# Patient Record
Sex: FEMALE | Race: WHITE
[De-identification: names, ages, dates, MRNs, and addresses within clinical notes are randomized per-mention and may not be internally consistent; named-entity substitution may affect disease eponyms.]

---

## 2019-01-31 ENCOUNTER — HOSPITAL ENCOUNTER (INPATIENT)
Dept: HOSPITAL 80 - BREH | Age: 79
LOS: 22 days | Discharge: NURSING, REHAB OR LONG-TERM CARE | DRG: 92 | End: 2019-02-22
Attending: INTERNAL MEDICINE | Admitting: INTERNAL MEDICINE
Payer: COMMERCIAL

## 2019-01-31 DIAGNOSIS — E11.51: ICD-10-CM

## 2019-01-31 DIAGNOSIS — K21.9: ICD-10-CM

## 2019-01-31 DIAGNOSIS — G95.11: Primary | ICD-10-CM

## 2019-01-31 DIAGNOSIS — G89.29: ICD-10-CM

## 2019-01-31 DIAGNOSIS — G82.22: ICD-10-CM

## 2019-01-31 DIAGNOSIS — Z96.652: ICD-10-CM

## 2019-01-31 DIAGNOSIS — Z87.440: ICD-10-CM

## 2019-01-31 DIAGNOSIS — E11.40: ICD-10-CM

## 2019-01-31 DIAGNOSIS — E03.9: ICD-10-CM

## 2019-01-31 DIAGNOSIS — E66.01: ICD-10-CM

## 2019-01-31 DIAGNOSIS — N31.9: ICD-10-CM

## 2019-01-31 DIAGNOSIS — E78.5: ICD-10-CM

## 2019-01-31 DIAGNOSIS — I10: ICD-10-CM

## 2019-01-31 DIAGNOSIS — K59.2: ICD-10-CM

## 2019-01-31 RX ADMIN — GABAPENTIN SCH MG: 100 CAPSULE ORAL at 20:28

## 2019-01-31 RX ADMIN — DOCUSATE SODIUM AND SENNOSIDES SCH TAB: 50; 8.6 TABLET ORAL at 20:27

## 2019-01-31 RX ADMIN — INSULIN HUMAN SCH UNITS: 100 INJECTION, SUSPENSION SUBCUTANEOUS at 21:20

## 2019-01-31 RX ADMIN — HYDROCODONE BITARTRATE AND ACETAMINOPHEN PRN TAB: 5; 325 TABLET ORAL at 20:27

## 2019-01-31 RX ADMIN — TRIAMCINOLONE ACETONIDE SCH APP: 1 OINTMENT TOPICAL at 21:20

## 2019-01-31 RX ADMIN — NORTRIPTYLINE HYDROCHLORIDE SCH MG: 25 CAPSULE ORAL at 20:28

## 2019-01-31 RX ADMIN — INSULIN HUMAN SCH: 100 INJECTION, SOLUTION PARENTERAL at 21:29

## 2019-01-31 RX ADMIN — ATORVASTATIN CALCIUM SCH MG: 20 TABLET, FILM COATED ORAL at 20:28

## 2019-02-01 LAB — PLATELET # BLD: 316 10^3/UL (ref 150–400)

## 2019-02-01 RX ADMIN — GABAPENTIN SCH MG: 100 CAPSULE ORAL at 16:45

## 2019-02-01 RX ADMIN — Medication SCH MG: at 09:03

## 2019-02-01 RX ADMIN — DOCUSATE SODIUM AND SENNOSIDES SCH TAB: 50; 8.6 TABLET ORAL at 09:02

## 2019-02-01 RX ADMIN — ATORVASTATIN CALCIUM SCH MG: 20 TABLET, FILM COATED ORAL at 21:12

## 2019-02-01 RX ADMIN — INSULIN HUMAN SCH UNITS: 100 INJECTION, SOLUTION PARENTERAL at 22:28

## 2019-02-01 RX ADMIN — GABAPENTIN SCH MG: 100 CAPSULE ORAL at 21:11

## 2019-02-01 RX ADMIN — INSULIN HUMAN SCH UNITS: 100 INJECTION, SOLUTION PARENTERAL at 17:34

## 2019-02-01 RX ADMIN — ASPIRIN 81 MG SCH MG: 81 TABLET ORAL at 09:03

## 2019-02-01 RX ADMIN — INSULIN HUMAN SCH UNITS: 100 INJECTION, SOLUTION PARENTERAL at 09:16

## 2019-02-01 RX ADMIN — FUROSEMIDE SCH MG: 20 TABLET ORAL at 09:02

## 2019-02-01 RX ADMIN — ENOXAPARIN SODIUM SCH MG: 100 INJECTION SUBCUTANEOUS at 09:06

## 2019-02-01 RX ADMIN — HYDROCODONE BITARTRATE AND ACETAMINOPHEN PRN TAB: 5; 325 TABLET ORAL at 20:34

## 2019-02-01 RX ADMIN — LEVOTHYROXINE SODIUM SCH MCG: 75 TABLET ORAL at 06:37

## 2019-02-01 RX ADMIN — GABAPENTIN SCH MG: 100 CAPSULE ORAL at 09:02

## 2019-02-01 RX ADMIN — INSULIN HUMAN SCH UNITS: 100 INJECTION, SOLUTION PARENTERAL at 11:58

## 2019-02-01 RX ADMIN — IRON SUPPLEMENT SCH MG: 325 TABLET ORAL at 09:03

## 2019-02-01 RX ADMIN — TRIAMCINOLONE ACETONIDE SCH APP: 1 OINTMENT TOPICAL at 21:53

## 2019-02-01 RX ADMIN — INSULIN HUMAN SCH UNITS: 100 INJECTION, SUSPENSION SUBCUTANEOUS at 22:28

## 2019-02-01 RX ADMIN — TRIAMCINOLONE ACETONIDE SCH: 1 OINTMENT TOPICAL at 12:01

## 2019-02-01 RX ADMIN — BISACODYL SCH MG: 10 SUPPOSITORY RECTAL at 16:46

## 2019-02-01 RX ADMIN — DOCUSATE SODIUM AND SENNOSIDES SCH TAB: 50; 8.6 TABLET ORAL at 09:09

## 2019-02-01 RX ADMIN — DOCUSATE SODIUM AND SENNOSIDES SCH TAB: 50; 8.6 TABLET ORAL at 21:12

## 2019-02-01 RX ADMIN — NORTRIPTYLINE HYDROCHLORIDE SCH MG: 25 CAPSULE ORAL at 21:12

## 2019-02-01 RX ADMIN — Medication SCH MG: at 17:35

## 2019-02-01 NOTE — PDOREHIP
Admission [facility]





- Admission - 3 Day Assessment Period


Admission Date/Day 1: 01/31/19


Day 2: 02/01/19


Day 3: 02/02/19





- Active Diagnoses


Comorbidities and Co-existing Conditions at Admission: 41839. PVD or PAD, 

44904. DM (e.g. diabetic retinopathy, nephropathy, and neuropathy)





- Skin Conditions


Unhealed Pressure Ulcer (1 or more/Stage 1 or >)-Admission: 0. No


# Stage 1 Pressure Ulcers-Admission: 0


# Stage 2 Pressure Ulcers-Admission: 0


# Stage 3 Pressure Ulcers-Admission: 0


# Stage 4 Pressure Ulcers-Admission: 0


# Unstageable Pressure Ulcers (Non-remove Dress)-Admission: 0


# Unstageable Pressure Ulcers (Slough/Eschar)-Admission: 0


# Unstageable Pressure Ulcers (Deep Tissue Injury)-Admission: 0

## 2019-02-01 NOTE — SOAPPROG
SOAP Progress Note


Assessment/Plan: 


Assessment:





Spinal cord injury, T1 level, incomplete RYAN class C.  PT and OT to optimize 

her mobility and activities of daily living. 





Complications of spinal cord injury with neurogenic bowel, neurogenic bladder 

and very impaired lower extremity motor function.  She will be treated with the 

neurogenic bowel and bladder protocols.  


* Liu catheter to be removed 2/1/2019 or as soon as the unit obtains enough 

catheters for Q 4 hr intermittent straight catheterization.  She will be taught 

once again in intermittent bladder catheterization to be done by herself.  She 

has adequate upper extremity motor function and sensation to be able to 

accomplish this task.  


* She is at risk for autonomic dysreflexia and will be monitored for signs of 

distress or elevated blood pressure and will be treated with the autonomic 

dysreflexia protocol on the rehabilitation unit. 





Chronic conditions of hypertension, dyslipidemia, hypothyroidism, and diabetes 

mellitus type 2.  She will be continued on medications for these conditions.


* Initiate metformin 500 mg twice daily with meals beginning evening of 2/1/ 2019.  Will likely be able to discontinue sliding scale insulin subsequently.  





History of peripheral neuropathy.  She is treated with nortriptyline 10 mg at 

h.s.  As she also reports insomnia, I will increase this to 25 mg at bedtime.  

Continue gabapentin at 100 mg three times daily.  Consider titration if 

symptoms are bothersome.





Peripheral vascular disease.  Continue aspirin. 





Skin lesions on the left toes.  These appear to be more likely consistent with 

cholesterol emboli than with pressure ulcers.  There will be a wound nurse 

consult to optimize her management. 





Fluid overload seen in the hospital.  


* Continue furosemide.  Renal function normal on BMP 2/1/2019.





Hypomagnesemia.  Continue magnesium oxide.  Magnesium very slightly low on 2/1/ 2019.





Anemia in the hospital.  


* Also had significant iron deficiency, so initiated iron sulfate 325 mg q.day 

starting 2/1/2019.


* Stable on CBC 2/1/2019, with hemoglobin 8.3 and hematocrit 25.8.  Recheck 

hemoglobin and hematocrit in 1 week





Obesity and diabetes mellitus will merit a consult with the dietitian for 

optimal nutrition.  





Prophylaxis.  She has prolonged immobility and elevated risk for DVT.  Will 

initiate enoxaparin at 40 mg subcutaneous daily.  This can be discontinued if 

her mobility improves significantly or once she is 2-3 months out from the 

onset of her spinal cord injury.








02/01/19 14:17





Subjective: 





Slept well.  Not in pain.  Notes improved movement to her feet.  No cough or 

dyspnea, no fevers or chills.


Objective: 





 Vital Signs











Temp Pulse Resp BP Pulse Ox


 


 36.9 C   102 H  18   138/70 H  92 


 


 02/01/19 05:28  02/01/19 05:28  02/01/19 05:28  02/01/19 09:05  02/01/19 05:28








 Laboratory Results





 02/01/19 06:00 





 02/01/19 06:00 





 











 01/31/19 02/01/19 02/02/19





 05:59 05:59 05:59


 


Intake Total  400 


 


Output Total  700 


 


Balance  -300 














Physical Exam





- Physical Exam


General Appearance: WD/WN, alert, no apparent distress, obese


Respiratory: No respiratory distress, No accessory muscle use


Skin: normal color, warm/dry


Neuro/Psych: alert, normal mood/affect, oriented x 3, motor weakness (

Demonstrates some movement of the feet, right greater than left, and has 

quadriceps extension of the right leg.)





ICD10 Worksheet


Patient Problems: 


 Problems











Problem Status Onset


 


Spinal cord stroke Acute

## 2019-02-02 RX ADMIN — ENOXAPARIN SODIUM SCH MG: 100 INJECTION SUBCUTANEOUS at 08:44

## 2019-02-02 RX ADMIN — FUROSEMIDE SCH MG: 20 TABLET ORAL at 08:47

## 2019-02-02 RX ADMIN — ASPIRIN 81 MG SCH MG: 81 TABLET ORAL at 08:46

## 2019-02-02 RX ADMIN — NORTRIPTYLINE HYDROCHLORIDE SCH MG: 25 CAPSULE ORAL at 21:12

## 2019-02-02 RX ADMIN — GABAPENTIN SCH MG: 100 CAPSULE ORAL at 21:13

## 2019-02-02 RX ADMIN — Medication SCH MG: at 17:39

## 2019-02-02 RX ADMIN — INSULIN HUMAN SCH UNITS: 100 INJECTION, SUSPENSION SUBCUTANEOUS at 21:13

## 2019-02-02 RX ADMIN — TRIAMCINOLONE ACETONIDE SCH: 1 OINTMENT TOPICAL at 17:04

## 2019-02-02 RX ADMIN — GABAPENTIN SCH MG: 100 CAPSULE ORAL at 08:45

## 2019-02-02 RX ADMIN — GABAPENTIN SCH MG: 100 CAPSULE ORAL at 17:18

## 2019-02-02 RX ADMIN — BISACODYL SCH MG: 10 SUPPOSITORY RECTAL at 19:40

## 2019-02-02 RX ADMIN — DOCUSATE SODIUM AND SENNOSIDES SCH TAB: 50; 8.6 TABLET ORAL at 08:45

## 2019-02-02 RX ADMIN — HYDROCODONE BITARTRATE AND ACETAMINOPHEN PRN TAB: 5; 325 TABLET ORAL at 08:47

## 2019-02-02 RX ADMIN — IRON SUPPLEMENT SCH MG: 325 TABLET ORAL at 08:47

## 2019-02-02 RX ADMIN — LEVOTHYROXINE SODIUM SCH MCG: 75 TABLET ORAL at 06:37

## 2019-02-02 RX ADMIN — TRIAMCINOLONE ACETONIDE SCH APP: 1 OINTMENT TOPICAL at 19:40

## 2019-02-02 RX ADMIN — DOCUSATE SODIUM AND SENNOSIDES SCH TAB: 50; 8.6 TABLET ORAL at 21:12

## 2019-02-02 RX ADMIN — INSULIN HUMAN SCH UNITS: 100 INJECTION, SOLUTION PARENTERAL at 08:44

## 2019-02-02 RX ADMIN — ATORVASTATIN CALCIUM SCH MG: 20 TABLET, FILM COATED ORAL at 21:12

## 2019-02-02 RX ADMIN — Medication SCH MG: at 08:46

## 2019-02-02 RX ADMIN — ACETAMINOPHEN PRN MG: 325 TABLET ORAL at 17:18

## 2019-02-02 NOTE — HOSPPROG
Hospitalist Progress Note


Assessment/Plan: 





Spinal cord injury, T1 level, incomplete RYAN class C.  PT and OT to optimize 

her mobility and activities of daily living. 





Complications of spinal cord injury with neurogenic bowel, neurogenic bladder 

and very impaired lower extremity motor function.  She will be treated with the 

neurogenic bowel and bladder protocols.  


* Liu catheter to be removed 2/1/2019 or as soon as the unit obtains enough 

catheters for Q 4 hr intermittent straight catheterization.  She will be taught 

once again in intermittent bladder catheterization to be done by herself.  She 

has adequate upper extremity motor function and sensation to be able to 

accomplish this task.  


* She is at risk for autonomic dysreflexia and will be monitored for signs of 

distress or elevated blood pressure and will be treated with the autonomic 

dysreflexia protocol on the rehabilitation unit. 


* STILL WAITING FOR SELF CATH SUPPLIES SO DESIREE IS STILL IN





Chronic conditions of hypertension, dyslipidemia, hypothyroidism, and diabetes 

mellitus type 2.  She will be continued on medications for these conditions.


* Initiate metformin 500 mg twice daily with meals beginning evening of 2/1/ 2019.  Will likely be able to discontinue sliding scale insulin subsequently.


* WILL DC SLIDING SCALE - BLOOD SUGARS ARE MOSTLY BELOW 200. WAIT SEVERAL DAYS 

BEFORE INCREASING METFORMIN  





History of peripheral neuropathy.  She is treated with nortriptyline 10 mg at 

h.s.  As she also reports insomnia, I will increase this to 25 mg at bedtime.  

Continue gabapentin at 100 mg three times daily.  Consider titration if 

symptoms are bothersome.





Peripheral vascular disease.  Continue aspirin. 





Skin lesions on the left toes.  These appear to be more likely consistent with 

cholesterol emboli than with pressure ulcers.  There will be a wound nurse 

consult to optimize her management. 





Fluid overload seen in the hospital.  


* Continue furosemide.  Renal function normal on BMP 2/1/2019.





Hypomagnesemia.  Continue magnesium oxide.  Magnesium very slightly low on 2/1/ 2019.





Anemia in the hospital.  


* Also had significant iron deficiency, so initiated iron sulfate 325 mg q.day 

starting 2/1/2019.


* Stable on CBC 2/1/2019, with hemoglobin 8.3 and hematocrit 25.8.  Recheck 

hemoglobin and hematocrit in 1 week





Obesity and diabetes mellitus will merit a consult with the dietitian for 

optimal nutrition.  





Prophylaxis.  She has prolonged immobility and elevated risk for DVT.  Will 

initiate enoxaparin at 40 mg subcutaneous daily.  This can be discontinued if 

her mobility improves significantly or once she is 2-3 months out from the 

onset of her spinal cord injury.








Subjective: no new compliants. very happy with rehab so far.


Objective: 


 Vital Signs











Temp Pulse Resp BP Pulse Ox


 


 37.0 C   106 H  18   142/71 H  93 


 


 02/01/19 20:00  02/01/19 20:00  02/01/19 20:00  02/02/19 08:46  02/01/19 20:00








 Laboratory Results





 02/01/19 06:00 





 02/01/19 06:00 





 











 02/01/19 02/02/19 02/03/19





 05:59 05:59 05:59


 


Intake Total 400 900 


 


Output Total 700 1800 


 


Balance -300 -900 














- Physical Exam


Constitutional: no apparent distress, appears nourished, not in pain


Eyes: anicteric sclera, EOMI


Ears, Nose, Mouth, Throat: moist mucous membranes


Cardiovascular: regular rate and rhythym, no murmur, rub, or gallop


Respiratory: no respiratory distress, no rales or rhonchi


Skin: warm, other (superficial ischemic skin wounds left toes)


Neurologic: AAOx3, other (wiggles toes)





ICD10 Worksheet


Patient Problems: 


 Problems











Problem Status Onset


 


Spinal cord stroke Acute

## 2019-02-03 RX ADMIN — DOCUSATE SODIUM AND SENNOSIDES SCH TAB: 50; 8.6 TABLET ORAL at 08:46

## 2019-02-03 RX ADMIN — IRON SUPPLEMENT SCH MG: 325 TABLET ORAL at 08:47

## 2019-02-03 RX ADMIN — INSULIN HUMAN SCH UNITS: 100 INJECTION, SUSPENSION SUBCUTANEOUS at 21:13

## 2019-02-03 RX ADMIN — ASPIRIN 81 MG SCH MG: 81 TABLET ORAL at 08:47

## 2019-02-03 RX ADMIN — GABAPENTIN SCH MG: 100 CAPSULE ORAL at 21:14

## 2019-02-03 RX ADMIN — GABAPENTIN SCH MG: 100 CAPSULE ORAL at 16:38

## 2019-02-03 RX ADMIN — LEVOTHYROXINE SODIUM SCH MCG: 75 TABLET ORAL at 06:25

## 2019-02-03 RX ADMIN — TRIAMCINOLONE ACETONIDE SCH APP: 1 OINTMENT TOPICAL at 21:14

## 2019-02-03 RX ADMIN — Medication SCH MG: at 18:20

## 2019-02-03 RX ADMIN — ENOXAPARIN SODIUM SCH MG: 100 INJECTION SUBCUTANEOUS at 08:47

## 2019-02-03 RX ADMIN — FUROSEMIDE SCH MG: 20 TABLET ORAL at 08:46

## 2019-02-03 RX ADMIN — GABAPENTIN SCH: 100 CAPSULE ORAL at 14:10

## 2019-02-03 RX ADMIN — NORTRIPTYLINE HYDROCHLORIDE SCH MG: 25 CAPSULE ORAL at 21:14

## 2019-02-03 RX ADMIN — ATORVASTATIN CALCIUM SCH MG: 20 TABLET, FILM COATED ORAL at 21:14

## 2019-02-03 RX ADMIN — TRIAMCINOLONE ACETONIDE SCH APP: 1 OINTMENT TOPICAL at 08:48

## 2019-02-03 RX ADMIN — Medication SCH MG: at 08:45

## 2019-02-03 RX ADMIN — BISACODYL SCH: 10 SUPPOSITORY RECTAL at 16:34

## 2019-02-03 RX ADMIN — DOCUSATE SODIUM AND SENNOSIDES SCH TAB: 50; 8.6 TABLET ORAL at 21:14

## 2019-02-03 RX ADMIN — GABAPENTIN SCH MG: 100 CAPSULE ORAL at 08:46

## 2019-02-03 NOTE — HOSPPROG
Hospitalist Progress Note


Assessment/Plan: 





Spinal cord injury, T1 level, incomplete RYAN class C.  PT and OT to optimize 

her mobility and activities of daily living. 





Complications of spinal cord injury with neurogenic bowel, neurogenic bladder 

and very impaired lower extremity motor function.  She will be treated with the 

neurogenic bowel and bladder protocols.  


* Liu catheter to be removed 2/1/2019 or as soon as the unit obtains enough 

catheters for Q 4 hr intermittent straight catheterization.  She will be taught 

once again in intermittent bladder catheterization to be done by herself.  She 

has adequate upper extremity motor function and sensation to be able to 

accomplish this task.  


* She is at risk for autonomic dysreflexia and will be monitored for signs of 

distress or elevated blood pressure and will be treated with the autonomic 

dysreflexia protocol on the rehabilitation unit. 


* BEING TAUGHT HOW TO SELF CATH





Chronic conditions of hypertension, dyslipidemia, hypothyroidism, and diabetes 

mellitus type 2.  She will be continued on medications for these conditions.


* Initiate metformin 500 mg twice daily with meals beginning evening of 2/1/ 2019.  Will likely be able to discontinue sliding scale insulin subsequently.


* WILL DC SLIDING SCALE - BLOOD SUGARS ARE MOSTLY BELOW 200. WAIT SEVERAL DAYS 

BEFORE INCREASING METFORMIN  


* DONT KNOW WHY SHE IS ON NPH AT NIGHT - THAT IS HER HOME REGIMEN





History of peripheral neuropathy.  She is treated with nortriptyline 10 mg at 

h.s.  As she also reports insomnia, I will increase this to 25 mg at bedtime.  

Continue gabapentin at 100 mg three times daily.  Consider titration if 

symptoms are bothersome.


* NEW NEUROPATHIC PAIN PRESUMABLY FROM SPINAL CORD INJURY - WILL INCREASE 

GABAPENTIN  MG TID. SHE HAD SOME NAUSEA IN THE PAST TO IT SO WILL 

INCREASE SLOWLY





Peripheral vascular disease.  Continue aspirin. 





Skin lesions on the left toes.  These appear to be more likely consistent with 

cholesterol emboli than with pressure ulcers.  There will be a wound nurse 

consult to optimize her management. 





Fluid overload seen in the hospital.  


* Continue furosemide.  Renal function normal on BMP 2/1/2019.


* INCREASE FUROSEMIDE TO 40 MG DAILY





CLOUDY URINE


* CHECK UA





Hypomagnesemia.  Continue magnesium oxide.  Magnesium very slightly low on 2/1/ 2019.





Anemia in the hospital.  


* Also had significant iron deficiency, so initiated iron sulfate 325 mg q.day 

starting 2/1/2019.


* Stable on CBC 2/1/2019, with hemoglobin 8.3 and hematocrit 25.8.  Recheck 

hemoglobin and hematocrit in 1 week





Obesity and diabetes mellitus will merit a consult with the dietitian for 

optimal nutrition.  





Prophylaxis.  She has prolonged immobility and elevated risk for DVT.  Will 

initiate enoxaparin at 40 mg subcutaneous daily.  This can be discontinued if 

her mobility improves significantly or once she is 2-3 months out from the 

onset of her spinal cord injury.








Subjective: norco doesn't help with pain. gabapentin does. wondering about 

increasing the dose. also wth cloudy smelly urine per RN


Objective: 


 Vital Signs











Temp Pulse Resp BP Pulse Ox


 


 36.8 C   100   16   133/75 H  97 


 


 02/02/19 20:00  02/02/19 20:00  02/02/19 20:00  02/03/19 08:47  02/02/19 20:00








 Laboratory Results





 02/01/19 06:00 





 02/01/19 06:00 





 











 02/02/19 02/03/19 02/04/19





 05:59 05:59 05:59


 


Intake Total 900  


 


Output Total 1800 1325 


 


Balance -900 -1325 














- Physical Exam


Constitutional: no apparent distress, appears nourished, not in pain


Eyes: anicteric sclera, EOMI


Ears, Nose, Mouth, Throat: moist mucous membranes


Cardiovascular: regular rate and rhythym, edema (2+)


Respiratory: no respiratory distress, no rales or rhonchi


Gastrointestinal: normoactive bowel sounds, soft, non-tender abdomen, no 

palpable masses


Skin: warm


Neurologic: AAOx3





ICD10 Worksheet


Patient Problems: 


 Problems











Problem Status Onset


 


Spinal cord stroke Acute

## 2019-02-04 RX ADMIN — DOCUSATE SODIUM AND SENNOSIDES SCH TAB: 50; 8.6 TABLET ORAL at 08:09

## 2019-02-04 RX ADMIN — Medication SCH MG: at 08:09

## 2019-02-04 RX ADMIN — IRON SUPPLEMENT SCH MG: 325 TABLET ORAL at 08:09

## 2019-02-04 RX ADMIN — GABAPENTIN SCH MG: 100 CAPSULE ORAL at 16:03

## 2019-02-04 RX ADMIN — NORTRIPTYLINE HYDROCHLORIDE SCH MG: 25 CAPSULE ORAL at 21:35

## 2019-02-04 RX ADMIN — GABAPENTIN SCH MG: 100 CAPSULE ORAL at 21:35

## 2019-02-04 RX ADMIN — ENOXAPARIN SODIUM SCH MG: 100 INJECTION SUBCUTANEOUS at 08:10

## 2019-02-04 RX ADMIN — ACETAMINOPHEN PRN MG: 325 TABLET ORAL at 21:35

## 2019-02-04 RX ADMIN — DOCUSATE SODIUM AND SENNOSIDES SCH: 50; 8.6 TABLET ORAL at 19:15

## 2019-02-04 RX ADMIN — INSULIN HUMAN SCH UNITS: 100 INJECTION, SUSPENSION SUBCUTANEOUS at 21:35

## 2019-02-04 RX ADMIN — Medication SCH MG: at 17:19

## 2019-02-04 RX ADMIN — TRIAMCINOLONE ACETONIDE SCH APP: 1 OINTMENT TOPICAL at 08:26

## 2019-02-04 RX ADMIN — TRIAMCINOLONE ACETONIDE SCH APP: 1 OINTMENT TOPICAL at 21:35

## 2019-02-04 RX ADMIN — FUROSEMIDE SCH MG: 20 TABLET ORAL at 08:09

## 2019-02-04 RX ADMIN — BISACODYL SCH: 10 SUPPOSITORY RECTAL at 15:29

## 2019-02-04 RX ADMIN — ACETAMINOPHEN PRN MG: 325 TABLET ORAL at 13:19

## 2019-02-04 RX ADMIN — LEVOTHYROXINE SODIUM SCH MCG: 75 TABLET ORAL at 07:18

## 2019-02-04 RX ADMIN — ASPIRIN 81 MG SCH MG: 81 TABLET ORAL at 08:09

## 2019-02-04 RX ADMIN — ATORVASTATIN CALCIUM SCH MG: 20 TABLET, FILM COATED ORAL at 21:35

## 2019-02-04 RX ADMIN — GABAPENTIN SCH MG: 100 CAPSULE ORAL at 08:09

## 2019-02-04 NOTE — SOAPPROG
SOAP Progress Note


Assessment/Plan: 


Assessment:


























Plan:





Subjective: 





Patient reports that she continues to have pain around her ankles.  She had 

some increased neuropathic pain over the weekend and her dosage of gabapentin 

was increased.  She does not report nausea with the gabapentin.  She does not 

report pain in her left toes.  She denies of the dysuria or suprapubic pain.


Objective: 





 Vital Signs











Temp Pulse Resp BP Pulse Ox


 


 36.8 C   100   19   143/79 H  93 


 


 02/04/19 07:26  02/04/19 07:26  02/04/19 07:26  02/04/19 08:08  02/04/19 07:26








 Laboratory Results





 02/01/19 06:00 





 02/01/19 06:00 





 











 02/03/19 02/04/19 02/05/19





 05:59 05:59 05:59


 


Intake Total  1350 354


 


Output Total 8873 1950 


 


Balance -1325 -600 354














ICD10 Worksheet


Patient Problems: 


 Problems











Problem Status Onset


 


Spinal cord stroke Acute

## 2019-02-04 NOTE — WOCRNPDOC
WOCRN Advanced Assessment Note





- Skin Integrity Problem, Advanced Assess


  ** Left Toe Unknown


Dressing Type: Open to Air


Wound Bed Constitution: Stable Eschar


Skin Integrity Problem Comment: First four toes on left foot have evidence of 

ischemic injury that is at least a month old. No sign of infection. The eschar 

on several of the wounds is peeling off and reveals a clean fully epithelized 

wound bed. The wound on the first digit is the deepest and will take the 

longest to heal, but as all of the wounds are healing appropriately continuing 

the current plan of care works. Please continue to paint wounds with betadine 

BID until all the eschar has peeled off. This may take several months for the 

great toe. Wound care will sign off.





  ** Perianal Denuded


Dressing Type: Open to Air


Skin Integrity Problem Comment: Mild partial thickness epithelial loss from 6 

to 9 oclock around anus to 0.5 cm. No concerns. May treat with calazime cream 

then switch to dimethicone cream when the area has epithelized. Wound care will 

sign off.

## 2019-02-04 NOTE — SOAPPROG
SOAP Progress Note


Assessment/Plan: 


Assessment:








Spinal cord injury, T1 level, incomplete RYAN class C.  PT and OT to optimize 

her mobility and activities of daily living.  GABAPENTIN FOR LOWER EXTREMITY 

NEUROPATHIC PAIN





Complications of spinal cord injury with neurogenic bowel, neurogenic bladder 

and very impaired lower extremity motor function.  She will be treated with the 

neurogenic bowel and bladder protocols.  


* Liu catheter to be removed 2/1/2019 or as soon as the unit obtains enough 

catheters for Q 4 hr intermittent straight catheterization.  Team rounds on 02/ 04 indicates the patient is unable to perform self catheterization due to a 

combination of body habitus and inability to abduct hips.  She has adequate 

upper extremity motor function and sensation to be able to accomplish this 

task.  


* She is at risk for autonomic dysreflexia and will be monitored for signs of 

distress or elevated blood pressure and will be treated with the autonomic 

dysreflexia protocol on the rehabilitation unit. 





Chronic conditions of hypertension, dyslipidemia, hypothyroidism, and diabetes 

mellitus type 2.  She will be continued on medications for these conditions.


* Initiate metformin 500 mg twice daily with meals beginning evening of 2/1/ 2019.  SLIDING SCALE INSULIN DISCHARGED OVER THE WEEKEND.


History of peripheral neuropathy.  She is treated with nortriptyline 10 mg at 

h.s.  As she also reports insomnia, I will increase this to 25 mg at bedtime.  

Continue gabapentin at 100 mg three times daily.  Consider titration if 

symptoms are bothersome.





HYPERTENSION-BLOOD PRESSURES TO ON 02/04 143/79.





Peripheral vascular disease.  Continue aspirin.  GOOD CAPILLARY REFILL OF BOTH 

LOWER EXTREMITIES





Skin lesions on the left toes.  These appear to be more likely consistent with 

cholesterol emboli than with pressure ulcers.  NURSING HAS NOTIFIED WOUND CARE 

NURSE WHO WILL SEE PATIENT TODAY.





Fluid overload seen in the hospital.  


* Continue furosemide.  Renal function normal on BMP 2/1/2019.





Hypomagnesemia.  Continue magnesium oxide.  Magnesium very slightly low on 2/1/ 2019.





Anemia in the hospital.  


* Also had significant iron deficiency, so initiated iron sulfate 325 mg q.day 

starting 2/1/2019.


* Stable on CBC 2/1/2019, with hemoglobin 8.3 and hematocrit 25.8.  Recheck 

hemoglobin and hematocrit in 1 week





Obesity and diabetes mellitus will merit a consult with the dietitian for 

optimal nutrition.  





Prophylaxis.  She has prolonged immobility and elevated risk for DVT.  Will 

initiate enoxaparin at 40 mg subcutaneous daily.  This can be discontinued if 

her mobility improves significantly or once she is 2-3 months out from the 

onset of her spinal cord injury.  OCCUPATIONAL THERAPY HAS ADDED THIGH-HIGH 

COMPRESSION STOCKINGS WHICH IS APPROPRIATE.




















Plan:





02/04/19 11:15





02/04/19 11:17





Subjective: 





The patient reports she has some pain and tightness around both ankles.  She 

had some increased neuropathic pain in the lower extremities over the weekend 

and her dosage of gabapentin was increased.  She denies nausea or other side 

effects with the gabapentin.  She denies abdominal pain.  She denies dysuria.


Objective: 





 Vital Signs











Temp Pulse Resp BP Pulse Ox


 


 36.8 C   100   19   143/79 H  93 


 


 02/04/19 07:26  02/04/19 07:26  02/04/19 07:26  02/04/19 08:08  02/04/19 07:26








 Laboratory Results





 02/01/19 06:00 





 02/01/19 06:00 





 











 02/03/19 02/04/19 02/05/19





 05:59 05:59 05:59


 


Intake Total  1350 354


 


Output Total 1325 1950 


 


Balance -1325 -600 354














Physical Exam





- Physical Exam


General Appearance: WD/WN, alert, no apparent distress


Respiratory: lungs clear, normal breath sounds


Peripheral Pulses: 1+: dorsalis-pedis (R), dorsalis-pedis (L)


Abdomen: normal bowel sounds, non-tender


Skin: other (Gangrenous changes distal toes with skin sloughing in all 5 

digits.  Both lower extremities warm, no erythema or induration.)


Extremities: No swelling, No Cheli's sign


Neuro/Psych: motor weakness (Motor exam consistent with paraplegia with 2/5 

strength of ankle dorsiflexors.)





ICD10 Worksheet


Patient Problems: 


 Problems











Problem Status Onset


 


Spinal cord stroke Acute

## 2019-02-05 RX ADMIN — LEVOTHYROXINE SODIUM SCH MCG: 75 TABLET ORAL at 05:04

## 2019-02-05 RX ADMIN — NORTRIPTYLINE HYDROCHLORIDE SCH MG: 25 CAPSULE ORAL at 22:05

## 2019-02-05 RX ADMIN — ENOXAPARIN SODIUM SCH MG: 100 INJECTION SUBCUTANEOUS at 08:20

## 2019-02-05 RX ADMIN — FUROSEMIDE SCH MG: 20 TABLET ORAL at 08:19

## 2019-02-05 RX ADMIN — INSULIN HUMAN SCH UNITS: 100 INJECTION, SUSPENSION SUBCUTANEOUS at 20:57

## 2019-02-05 RX ADMIN — BISACODYL SCH: 10 SUPPOSITORY RECTAL at 15:35

## 2019-02-05 RX ADMIN — Medication SCH MG: at 08:19

## 2019-02-05 RX ADMIN — TRIAMCINOLONE ACETONIDE SCH APP: 1 OINTMENT TOPICAL at 22:06

## 2019-02-05 RX ADMIN — TRIAMCINOLONE ACETONIDE SCH APP: 1 OINTMENT TOPICAL at 08:28

## 2019-02-05 RX ADMIN — GABAPENTIN SCH MG: 100 CAPSULE ORAL at 15:39

## 2019-02-05 RX ADMIN — ACETAMINOPHEN PRN MG: 325 TABLET ORAL at 05:04

## 2019-02-05 RX ADMIN — Medication SCH MG: at 17:24

## 2019-02-05 RX ADMIN — IRON SUPPLEMENT SCH MG: 325 TABLET ORAL at 08:20

## 2019-02-05 RX ADMIN — DOCUSATE SODIUM AND SENNOSIDES SCH: 50; 8.6 TABLET ORAL at 22:06

## 2019-02-05 RX ADMIN — DOCUSATE SODIUM AND SENNOSIDES SCH: 50; 8.6 TABLET ORAL at 08:28

## 2019-02-05 RX ADMIN — ATORVASTATIN CALCIUM SCH MG: 20 TABLET, FILM COATED ORAL at 22:05

## 2019-02-05 RX ADMIN — GABAPENTIN SCH MG: 100 CAPSULE ORAL at 20:57

## 2019-02-05 RX ADMIN — GABAPENTIN SCH MG: 100 CAPSULE ORAL at 08:19

## 2019-02-05 RX ADMIN — ASPIRIN 81 MG SCH MG: 81 TABLET ORAL at 08:19

## 2019-02-06 RX ADMIN — NORTRIPTYLINE HYDROCHLORIDE SCH MG: 25 CAPSULE ORAL at 21:11

## 2019-02-06 RX ADMIN — ATORVASTATIN CALCIUM SCH MG: 20 TABLET, FILM COATED ORAL at 21:11

## 2019-02-06 RX ADMIN — FUROSEMIDE SCH MG: 20 TABLET ORAL at 09:48

## 2019-02-06 RX ADMIN — IRON SUPPLEMENT SCH MG: 325 TABLET ORAL at 09:49

## 2019-02-06 RX ADMIN — Medication SCH MG: at 17:30

## 2019-02-06 RX ADMIN — GABAPENTIN SCH MG: 100 CAPSULE ORAL at 21:11

## 2019-02-06 RX ADMIN — TRIAMCINOLONE ACETONIDE SCH APP: 1 OINTMENT TOPICAL at 21:00

## 2019-02-06 RX ADMIN — TRIAMCINOLONE ACETONIDE SCH APP: 1 OINTMENT TOPICAL at 09:50

## 2019-02-06 RX ADMIN — INSULIN HUMAN SCH UNITS: 100 INJECTION, SUSPENSION SUBCUTANEOUS at 21:19

## 2019-02-06 RX ADMIN — Medication SCH MG: at 09:49

## 2019-02-06 RX ADMIN — DOCUSATE SODIUM AND SENNOSIDES SCH TAB: 50; 8.6 TABLET ORAL at 21:11

## 2019-02-06 RX ADMIN — LEVOTHYROXINE SODIUM SCH MCG: 75 TABLET ORAL at 05:49

## 2019-02-06 RX ADMIN — GABAPENTIN SCH MG: 100 CAPSULE ORAL at 09:48

## 2019-02-06 RX ADMIN — DOCUSATE SODIUM AND SENNOSIDES SCH: 50; 8.6 TABLET ORAL at 09:49

## 2019-02-06 RX ADMIN — GABAPENTIN SCH MG: 100 CAPSULE ORAL at 15:07

## 2019-02-06 RX ADMIN — ASPIRIN 81 MG SCH MG: 81 TABLET ORAL at 09:48

## 2019-02-06 RX ADMIN — ACETAMINOPHEN PRN MG: 325 TABLET ORAL at 09:57

## 2019-02-06 RX ADMIN — ENOXAPARIN SODIUM SCH MG: 100 INJECTION SUBCUTANEOUS at 09:48

## 2019-02-06 RX ADMIN — ACETAMINOPHEN PRN MG: 325 TABLET ORAL at 18:19

## 2019-02-06 RX ADMIN — BISACODYL SCH MG: 10 SUPPOSITORY RECTAL at 15:08

## 2019-02-06 NOTE — SOAPPROG
SOAP Progress Note


Assessment/Plan: 


Assessment:





Spinal cord injury, T1 level, incomplete RYAN class C.  


* Initial functional independence measure was 52 on 2/4/2019.  She does 

grooming and hygiene with standby assist.  Upper body dressing is done with 

minimal assistance.  She was able to self propel a wheelchair 70 ft with 

standby assist.  Otherwise mobility and ADLs require maximum to total assist.  


* Continue PT and OT to optimize her mobility and activities of daily living. 





Complications of spinal cord injury with neurogenic bowel, neurogenic bladder 

and very impaired lower extremity motor function.  She will be treated with the 

neurogenic bowel and bladder protocols.  


* Liu catheter was removed 2/1/2019.  She is having intermittent 

catheterization per nursing.  She is unable to participate herself yet as she 

does not have those stability to be seated upright in order to visualize the 

perineum and use her arms and hands.  Discussed with patient 2/6/2019.  She 

prefers to continue intermittent catheterization and as she developed stability 

and has hands free, she will participate more in self catheterization.


* Bowel program appears to be effective with bisacodyl suppository at 1600.  

Nursing reports her mornings are far too busy with OT tasks and bladder 

catheterization, and 1600 is a preferred time.


* She is at risk for autonomic dysreflexia.  She has had no signs or symptoms.





Chronic conditions of dyslipidemia, hypothyroidism, and diabetes mellitus type 

2.  She will be continued on medications for these conditions.


* Initiate metformin 500 mg twice daily with meals beginning evening of 2/1/ 2019.  Increased to 1000 mg twice daily evening of 2/6/2019.





History of peripheral neuropathy.  She is treated with nortriptyline 10 mg at 

h.s.  As she also reports insomnia, increased to 25 mg at bedtime on 2/1/2019..

  


* Gabapentin at 100 mg three times daily was increased to 200 mg three times 

daily on 2/4/2019.  Monitor for improvement in symptoms.





Fatigue, likely due to poor sleep.  Nursing has changed her to another mattress 

which she reports feels more comfortable, 2/6/2019.  If she remains fatigued 

while sleeping better, will evaluate further.  Will consider discontinuation of 

gabapentin.





Left knee pain.  X-ray 2/6/2019 shows moderate to severe medial compartment 

osteoarthritis and a knee effusion.


* Consider bracing knee for positioning.  Will discuss further with physical 

therapy.


* Continue acetaminophen p.r.n. and hydrocodone/acetaminophen p.r.n.





Fluid overload seen in the hospital.  


* Continue furosemide.  Renal function normal on BMP 2/1/2019.


* Check weight Tuesday Thursday and Saturday.





Hypertension.  Intermittently above target, on amlodipine 5 mg q.day and 

furosemide 40 mg q.day.


* If fluid status has stabilized consider discontinuing furosemide and 

initiating hydrochlorothiazide or ACE or ARB for better blood pressure effect.





Peripheral vascular disease.  Continue aspirin. 





Skin lesions on the left toes, ischemic.  Seen by wound nurse.  They appear to 

be healing.  Continue painting with Betadine twice daily until eschar is 

resolved.





Hypomagnesemia.  Continue magnesium oxide.  Magnesium very slightly low on 2/1/ 2019.





Anemia in the hospital.  


* Also had significant iron deficiency, so initiated iron sulfate 325 mg q.day 

starting 2/1/2019.


* Stable on CBC 2/1/2019, with hemoglobin 8.3 and hematocrit 25.8.  Recheck 

hemoglobin and hematocrit in 1 week





Obesity and diabetes mellitus will merit a consult with the dietitian for 

optimal nutrition.  





Prophylaxis.  She has prolonged immobility and elevated risk for DVT.  Will 

initiate enoxaparin at 40 mg subcutaneous daily.  This can be discontinued if 

her mobility improves significantly or once she is 2-3 months out from the 

onset of her spinal cord injury.








02/06/19 17:06





Subjective: 





Has fatigue.  Poor sleep last night and complains about the comfort of the bed; 

she is on a specialized mattress to protect against pressure sores.  She has 

left knee pain especially with positioning during transfers including with the 

Burke lift.  Had bowel movement this afternoon after a suppository.  Has not 

been able to participate in intermittent catheterization, partly because when 

supine she is unable to see her perineum.  She is not yet stable enough sitting 

upright to use her hands.


Objective: 





 Vital Signs











Temp Pulse Resp BP Pulse Ox


 


 36.8 C   99   18   143/62 H  93 


 


 02/06/19 05:46  02/06/19 05:46  02/06/19 05:46  02/06/19 05:46  02/06/19 05:46








 Laboratory Results





 02/01/19 06:00 





 02/01/19 06:00 





 











 02/05/19 02/06/19 02/07/19





 05:59 05:59 05:59


 


Intake Total 1126 1592 340


 


Output Total 2100 2250 1050


 


Balance -974 -658 -710














Physical Exam





- Physical Exam


General Appearance: WD/WN, alert, no apparent distress


Respiratory: No respiratory distress, No accessory muscle use


Skin: normal color, warm/dry


Extremities: pedal edema (Trace to 1+ bilateral), No calf tenderness


Neuro/Psych: alert, normal mood/affect, oriented x 3, motor weakness (Bilateral 

lower extremities)





ICD10 Worksheet


Patient Problems: 


 Problems











Problem Status Onset


 


Spinal cord stroke Acute

## 2019-02-07 RX ADMIN — TRIAMCINOLONE ACETONIDE SCH APP: 1 OINTMENT TOPICAL at 07:49

## 2019-02-07 RX ADMIN — DOCUSATE SODIUM AND SENNOSIDES SCH: 50; 8.6 TABLET ORAL at 07:44

## 2019-02-07 RX ADMIN — DOCUSATE SODIUM AND SENNOSIDES SCH TAB: 50; 8.6 TABLET ORAL at 21:37

## 2019-02-07 RX ADMIN — HYDROCORTISONE PRN APP: 25 CREAM TOPICAL at 21:38

## 2019-02-07 RX ADMIN — GABAPENTIN SCH MG: 100 CAPSULE ORAL at 07:40

## 2019-02-07 RX ADMIN — Medication SCH MG: at 07:41

## 2019-02-07 RX ADMIN — ASPIRIN 81 MG SCH MG: 81 TABLET ORAL at 07:41

## 2019-02-07 RX ADMIN — TRIAMCINOLONE ACETONIDE SCH APP: 1 OINTMENT TOPICAL at 21:38

## 2019-02-07 RX ADMIN — INSULIN HUMAN SCH UNITS: 100 INJECTION, SUSPENSION SUBCUTANEOUS at 21:37

## 2019-02-07 RX ADMIN — ACETAMINOPHEN PRN MG: 325 TABLET ORAL at 21:56

## 2019-02-07 RX ADMIN — GABAPENTIN SCH MG: 100 CAPSULE ORAL at 21:37

## 2019-02-07 RX ADMIN — Medication SCH MG: at 17:11

## 2019-02-07 RX ADMIN — LEVOTHYROXINE SODIUM SCH MCG: 75 TABLET ORAL at 05:58

## 2019-02-07 RX ADMIN — ATORVASTATIN CALCIUM SCH MG: 20 TABLET, FILM COATED ORAL at 21:37

## 2019-02-07 RX ADMIN — ACETAMINOPHEN PRN MG: 325 TABLET ORAL at 14:01

## 2019-02-07 RX ADMIN — ENOXAPARIN SODIUM SCH MG: 100 INJECTION SUBCUTANEOUS at 07:42

## 2019-02-07 RX ADMIN — GABAPENTIN SCH MG: 100 CAPSULE ORAL at 16:02

## 2019-02-07 RX ADMIN — NORTRIPTYLINE HYDROCHLORIDE SCH MG: 25 CAPSULE ORAL at 21:37

## 2019-02-07 RX ADMIN — FUROSEMIDE SCH MG: 20 TABLET ORAL at 07:42

## 2019-02-07 RX ADMIN — IRON SUPPLEMENT SCH MG: 325 TABLET ORAL at 07:41

## 2019-02-07 RX ADMIN — BISACODYL SCH: 10 SUPPOSITORY RECTAL at 15:46

## 2019-02-07 RX ADMIN — HYDROCODONE BITARTRATE AND ACETAMINOPHEN PRN TAB: 5; 325 TABLET ORAL at 23:30

## 2019-02-07 NOTE — SOAPPROG
SOAP Progress Note


Assessment/Plan: 


Assessment:





Spinal cord injury, T1 level, incomplete RYAN class C.  


* Initial functional independence measure was 52 on 2/4/2019.  She does 

grooming and hygiene with standby assist.  Upper body dressing is done with 

minimal assistance.  She was able to self propel a wheelchair 70 ft with 

standby assist.  Otherwise mobility and ADLs require maximum to total assist.  


* Continue PT and OT to optimize her mobility and activities of daily living. 





Complications of spinal cord injury with neurogenic bowel, neurogenic bladder 

and very impaired lower extremity motor function.  She will be treated with the 

neurogenic bowel and bladder protocols.  


* Liu catheter was removed 2/1/2019.  She is having intermittent 

catheterization per nursing.  She is unable to participate herself yet as she 

does not have those stability to be seated upright in order to visualize the 

perineum and use her arms and hands.  Discussed with patient 2/6/2019.  She 

prefers to continue intermittent catheterization and as she developed stability 

and has hands free, she will participate more in self catheterization.


* Bowel program appears to be effective with bisacodyl suppository at 1600.  

Nursing reports her mornings are far too busy with OT tasks and bladder 

catheterization, and 1600 is a preferred time.


* She is at risk for autonomic dysreflexia.  She has had no signs or symptoms.





Chronic conditions of dyslipidemia, hypothyroidism, and diabetes mellitus type 

2.  She will be continued on medications for these conditions.


* Initiate metformin 500 mg twice daily with meals beginning evening of 2/1/ 2019.  Increased to 1000 mg twice daily evening of 2/6/2019.





History of peripheral neuropathy.  She is treated with nortriptyline 10 mg at 

h.s.  As she also reports insomnia, increased to 25 mg at bedtime on 2/1/2019..

  


* Gabapentin at 100 mg three times daily was increased to 200 mg three times 

daily on 2/4/2019.  Monitor for improvement in symptoms.





Fatigue, likely due to poor sleep.  Nursing has changed her to another mattress 

which she reports feels more comfortable, 2/6/2019. 


* Fatigue is much improved with improved sleep.





Left knee pain.  X-ray 2/6/2019 shows moderate to severe medial compartment 

osteoarthritis and a knee effusion.


* Improved with better positioning in bed initiated by Physical therapy.


* Continue acetaminophen p.r.n. and hydrocodone/acetaminophen p.r.n.





Fluid overload seen in the hospital.  


* Has had furosemide 40 mg q.day for 4 days.  Weight is unchanged.  Discontinue 

furosemide starting 2/8/2019.


* Check weight Tuesday Thursday and Saturday.





Hypertension.  Intermittently above target, on amlodipine 5 mg q.day and 

furosemide 40 mg q.day.


* Discontinue furosemide 2/8/2019.


* Initiate lisinopril at 10 mg q.day starting 2/8/2019.  She reports she was 

using it previously and had no adverse effects.  Repeat BMP on 2/13/2019.





Hemorrhoids.  Initiate topical steroid preparation, 2/7/2019.





Peripheral vascular disease.  Continue aspirin. 





Skin lesions on the left toes, ischemic.  Seen by wound nurse.  They appear to 

be healing.  Continue painting with Betadine twice daily until eschar is 

resolved.





Hypomagnesemia.  Continue magnesium oxide.  Magnesium very slightly low on 2/1/ 2019.





Anemia in the hospital.  


* Also had significant iron deficiency, so initiated iron sulfate 325 mg q.day 

starting 2/1/2019.


* Stable on CBC 2/1/2019, with hemoglobin 8.3 and hematocrit 25.8.  Recheck 

hemoglobin and hematocrit 2/11/2019





Obesity and diabetes mellitus will merit a consult with the dietitian for 

optimal nutrition.  





Prophylaxis.  She has prolonged immobility and elevated risk for DVT.  Will 

initiate enoxaparin at 40 mg subcutaneous daily.  This can be discontinued if 

her mobility improves significantly or once she is 2-3 months out from the 

onset of her spinal cord injury.





DISPOSITION:  Discharge date tentatively set for 2/15/2019.  Pending progress, 

she may be appropriate to discharge to a skilled nursing facility for more 

prolonged rehabilitation.





02/07/19 15:49





Subjective: 





Her legs feels sore after working with physical therapy today.  Knee pain is 

improved.  Slept well.  Otherwise without complaints.


Objective: 





 Vital Signs











Temp Pulse Resp BP Pulse Ox


 


 36.7 C   97   16   153/76 H  96 


 


 02/07/19 06:05  02/07/19 06:05  02/07/19 06:05  02/07/19 07:42  02/07/19 06:05








 Laboratory Results





 02/01/19 06:00 





 02/01/19 06:00 





 











 02/06/19 02/07/19 02/08/19





 05:59 05:59 05:59


 


Intake Total 1592 440 454


 


Output Total 2250 1740 655


 


Balance -658 -1300 -201














Physical Exam





- Physical Exam


General Appearance: WD/WN, alert, no apparent distress


Respiratory: No respiratory distress, No accessory muscle use


Cardiac/Chest: edema (Trace to 1+ bilateral pretibial)


Skin: normal color, warm/dry


Neuro/Psych: alert, normal mood/affect, oriented x 3, motor weakness





ICD10 Worksheet


Patient Problems: 


 Problems











Problem Status Onset


 


Spinal cord stroke Acute

## 2019-02-07 NOTE — NOWCEV
[facility] OUTPATIENT REHABILITATION SERVICES

 WHEELCHAIR CLINIC EVALUATION AND LETTER OF JUSTIFICATION





Patient Name: MICHAELA DIAZ Physician:     MD Tatiana Inman Date:      02/06/19 Therapist:     Trish Ybarra PT,MSPT

YOB: 1940 MR#:            [id number]

Home Phone:   [phone] Acct #:         [id number]

Contact:         Michaela Diaz Subscriber:    MICHAELA DIAZ

Primary Ins:     [employer] IP 





EVALUATION FINDINGS

Medical history  - Michaela is a 77y/o female who sustained an ischemic SCI 
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 
bypass graft which included reimplantation of 2 renal arteries.  The surgery 
was performed on 12/7/18, and she had B LE weakness immediately after.  During 
hospitalization she had a small ulcer on her sacrum and ulcers on the toes of 
her L foot.  She was referred to have recommendations made for the most 
medically appropriate wheelchair to allow for safe and independent propulsion 
at access [facility].  



Functional Mobility  - Michaela is not able to ambulate, even with an AD such 
as a cane or walker due to her paraplegia.  To complete transitions to/from a 
MWC she requires mod A x1 with use of a slide board.  She had difficulty self 
propelling the [facility] MWC which she is currently utilizing in the hospital.  She 
can only propel this chair less than household distances, and this causes pain 
in her shoulder and she feels that she is going to lose the balance forward.  
She has difficulty reaching back to the propulsion wheel, which causes her 
strokes to be short and inefficient.  In this [facility] MWC, Michaela has to be 
pushed to propel distances equivalent to household distances.  To complete sup 
to/from sit transitions, she requires mod A x2 for trunk and LE management. 



Head/Trunk control - Michaela's head control is WFL.  Her trunk control is 
limited.  To sit unsupported, she requires B UE support on mat and CG-occ min 
A.  She is not able to perform functional reaching or tasks in unsupported 
sitting.  In the [facility] MWC which she is currently utilizing, Michaela feels that 
she is tipping forward, which again limits her functional reach.   



Motor involvement - Michaela has significant motor involvement below her level 
of injury, and global weakness in her UEs.  MMT is as follows: ankle PF/DF B: 2-
/5, knee ext B: 2-/5, gluteals B: 1+/5, shoulder abduction and flexion B: 4-/5, 
but pain with testing of her R shoulder.  She has limited LE ROM.  Ankle DF is 
limited to 0degrees passively B, following some stretching.  R hip ER is 
limited to ~5deg and is painful, L hip IR is limited, but not painful.  She 
also has pain in her L knee, and flexion is limited to 90deg.  She is hypotonic 
below her injury.  



Posture - Michaela sits with a level pelvis and shoulders.  She has a posterior 
pelvic tilt, a thoracic kyphosis with rounded shoulders and a somewhat forward 
head.  She sits with her R hip slightly internally rotated and B feet in ER.  



Skin Sensation - Michaela has impaired protective sensation below her level of 
injury.  She has hypersensitivity in B feet that radiates into her lower legs.  
She has a h/o a sacral ulcer, and has an active wound on her L foot.  The wound 
her distal L great toe is unstageable and she has additional wounds on the 
dorsal aspect of toes 2-5, which where covered but draining at time of this 
evaluation.  



Endurance - Michaela has significantly impaired endurance.  She can only propel 
the [facility] MWC she is currently utilizing less than household distances prior to 
fatigue.  



ADLs - Michaela is able to perform basic ADLs from a wheelchair level including 
feeding and washing up.  She requires external assistance for toileting, 
bathing and dressing.  Her functional reach iat perform ADLs n the [facility] MWC she 
is currently using is limited due to decreased postural control, and lack of 
support provided by the current seating system.   



Cognitive/Social - Michaela is fully cognizant and able to make her own medical 
decisions.  She is , but her  has disability and was living with 
her son and daughter-in-law.  There is a ramp to enter the home.



Current wheelchair - Michaela does not current own a wheelchair.  While in 
inpatient, she has been utilizing a [facility] with custom cushion.  This chair is 
not meeting her needs on many levels.  She is not able to achieve an 
appropriate amount of postural support and control in this chair and frequently 
feels she is going to lose her balance forward.  The back canes on the chair 
are hitting her in the back of the arms as she tried to reach back to the 
propulsion wheel, which is too far back for her to be able to reach to 
effectively propel.  The arm rests are not adjustable and interfere with her 
ability to reach the propulsion wheel.  This is placing her at risk for a 
shoulder injury due to poor posture and positioning when attempting to propel.  
Additionally, the chair is too heavy for her to effectively push even household 
distance on a level surface.  The sling back and cushion on the chair she is 
utilizing does not provide Michaela with adequate postural support, and is 
limiting her ability to perform functional reaching activities from a 
wheelchair level.      



MEDICAL and FUNCTIONAL NEED/OBJECTIVES

To procure an ultra light weight MWC and custom seating system to allow for 
safe and independent access to MRADLs within her home.  



EQUIPMENT RECOMMENDATIONS AND JUSTIFICATIONS

The following recommendations are believed to be the most cost effective way to 
meet the patients medical and functional needs.

* Ultra lightweight MWC:  Needed to provide Michaela with safe, consistent, and 
independent access to MRADLs in her home.  Michaela is not able to ambulate, 
even with a walker, crutches, or cane due to paraplegia.  She is not able to 
utilize a std MWC or light weight MWC for several reasons.  First, Michaela 
requires a chair with an adjustable rear francoise to allow her to reach her 
propulsion wheel for independent self propulsion of household distances.  A 
propulsion wheel that is placed in a more forward position will decrease risk 
of shoulder injury, as Michaela experiences shoulder pain when attempting to 
wheel a std MWC.  An adjustable rear francoise is only available in an ultra 
lightweight frame.  Michaela requires a lighter weight chair due to her 
decreased UE strength and poor endurance.  Without the ultra light weight frame
, Michaela will be unable to independently self propel between rooms in her 
home at access ADLs.  Additionally, Michaela requires a custom seating system 
that cannot be fitted on a std MWC.  In an ultra light weight MWC, Michaela 
will be able to safely and independently access MRADLs in her home without 
assistance or injuring her shoulders.

* Skin protection positioning cushion: Needed to optimize pressure distribution 
and decrease risk of future skin breakdown, as Michaela has a (+) h/o skin 
breakdown on her sacrum.  Additionally, Michaela requires the postural support 
provided by the cushion to prevent her from experiencing anterior LOB due to 
her decreased trunk control second to paraplegia, as well as to optimize the 
position of her shoulders to allow for effective self propulsion.  An off the 
shelf cushion would lead to further skin breakdown, as well as an inability to 
perform ADLs from her wheelchair due to her limited trunk control.  Therefore a 
skin protection positioning cushion is warranted.   

* Posterior positioning back rest: Needed to provide Michaela with appropriate 
postural support and control to allow her to perform functional reaching tasks 
in order to complete ADLs without loss of balance.  Additionally, the back rest 
needs to be able to accommodate her thoracic kyphosis and work in conjunction 
with her skin protection positioning cushion to optimize pressure distribution 
to decrease risk of future skin breakdown.  This cannot be accomplished with an 
off the shelf back rest.  

* Height adjustable flip back arm rests:  Height adjustable arm rests are 
required so that the arm rest can be positioned to prevent interference with 
self propulsion, as well as to be at the appropriate height to maximizing 
independence with transfers.  Flip back is necessary so that the arm rests do 
not interfere with slide board transfers.  

* Angle adjustable foot plate with heel loop:  Angle adjustable foot plate is 
needed so that the foot plate can be positioned at an angle that does not place 
increased pressure on Michaela's feet where she is experiencing skin breakdown, 
as well as hypersensitivity.  An inability to adjust the angle of the foot 
plate could lead to decreased wound healing and further issues with skin 
breakdown.  Heel loops are necessary to prevent Peytons feet from falling 
off her foot plates as she has severely limited motor activation in her LEs.  

* Anti-tippers:  Needed to prevent Peytons chair from flipping over backward 
during propulsion, when performing functional ADLs form her wheelchair, or 
during transfers.  

* Wheel lock extensions: Needed to provide Michaela with the ability to easily 
lock her wheelchair prior to transfers and performing ADLs.  Due to her limited 
trunk control, Michaela requires the extensions to prevent her from 
experiencing anterior LOB when locking her wheels.

* Pelvic belt: Needed to stabilize Peytons pelvis to prevent sheering when 
self propelling her wheelchair.  This will also provide safely when performing 
functional tasks from a wheelchair level, or when propelling up/down the ramp 
to access her home.  



These recommendations are based on the likelihood that Michaela will require 
the use of an ultra light wheelchair for all mobility and to access ADLs for 
the rest of her life.  If you have any questions or concerns regarding the 
stated recommendations, please feel free to contact the therapist at [phone]
[phone].



Thank you for your cooperation in obtaining the necessary equipment for this 
patient.





TEE Lantigua

## 2019-02-08 RX ADMIN — ASPIRIN 81 MG SCH MG: 81 TABLET ORAL at 08:16

## 2019-02-08 RX ADMIN — LISINOPRIL SCH MG: 10 TABLET ORAL at 08:19

## 2019-02-08 RX ADMIN — ACETAMINOPHEN PRN MG: 325 TABLET ORAL at 21:37

## 2019-02-08 RX ADMIN — INSULIN HUMAN SCH UNITS: 100 INJECTION, SUSPENSION SUBCUTANEOUS at 21:34

## 2019-02-08 RX ADMIN — GABAPENTIN SCH MG: 100 CAPSULE ORAL at 08:16

## 2019-02-08 RX ADMIN — LEVOTHYROXINE SODIUM SCH MCG: 75 TABLET ORAL at 06:13

## 2019-02-08 RX ADMIN — GABAPENTIN SCH MG: 100 CAPSULE ORAL at 21:37

## 2019-02-08 RX ADMIN — NORTRIPTYLINE HYDROCHLORIDE SCH MG: 25 CAPSULE ORAL at 21:34

## 2019-02-08 RX ADMIN — BISACODYL SCH MG: 10 SUPPOSITORY RECTAL at 16:24

## 2019-02-08 RX ADMIN — ENOXAPARIN SODIUM SCH MG: 100 INJECTION SUBCUTANEOUS at 08:16

## 2019-02-08 RX ADMIN — DOCUSATE SODIUM AND SENNOSIDES SCH TAB: 50; 8.6 TABLET ORAL at 08:15

## 2019-02-08 RX ADMIN — GABAPENTIN SCH MG: 100 CAPSULE ORAL at 16:24

## 2019-02-08 RX ADMIN — DOCUSATE SODIUM AND SENNOSIDES SCH TAB: 50; 8.6 TABLET ORAL at 21:37

## 2019-02-08 RX ADMIN — Medication SCH MG: at 08:15

## 2019-02-08 RX ADMIN — TRIAMCINOLONE ACETONIDE SCH APP: 1 OINTMENT TOPICAL at 21:46

## 2019-02-08 RX ADMIN — IRON SUPPLEMENT SCH MG: 325 TABLET ORAL at 08:16

## 2019-02-08 RX ADMIN — Medication SCH MG: at 18:02

## 2019-02-08 RX ADMIN — ATORVASTATIN CALCIUM SCH MG: 20 TABLET, FILM COATED ORAL at 21:33

## 2019-02-08 RX ADMIN — TRIAMCINOLONE ACETONIDE SCH: 1 OINTMENT TOPICAL at 09:20

## 2019-02-08 NOTE — SOAPPROG
SOAP Progress Note


Assessment/Plan: 


Assessment:





Spinal cord injury, T1 level, incomplete RYAN class C.  


* Initial functional independence measure was 52 on 2/4/2019.  She does 

grooming and hygiene with standby assist.  Upper body dressing is done with 

minimal assistance.  She was able to self propel a wheelchair 70 ft with 

standby assist.  Otherwise mobility and ADLs require maximum to total assist.  


* Continue PT and OT to optimize her mobility and activities of daily living. 





Complications of spinal cord injury with neurogenic bowel, neurogenic bladder.  

She will be treated with the neurogenic bowel and bladder protocols.  


* Liu catheter was removed 2/1/2019.  She is having intermittent 

catheterization per nursing.  She is unable to participate herself yet as she 

does not have those stability to be seated upright in order to visualize the 

perineum and use her arms and hands.  Discussed with patient 2/6/2019.  She 

prefers to continue intermittent catheterization and as she developed stability 

and has hands free, she will participate more in self catheterization.


* Bowel program appears to be effective with bisacodyl suppository at 1600.  

Nursing reports her mornings are far too busy with OT tasks and bladder 

catheterization, and 1600 is a preferred time.


* She is at risk for autonomic dysreflexia.  She has had no signs or symptoms.





Chronic conditions of dyslipidemia, hypothyroidism, and diabetes mellitus type 

2.  She will be continued on medications for these conditions.


* Initiate metformin 500 mg twice daily with meals beginning evening of 2/1/ 2019.  Increased to 1000 mg twice daily evening of 2/6/2019.





History of peripheral neuropathy.  She was treated with nortriptyline 10 mg at 

h.s.  As she also reports insomnia, increased to 25 mg at bedtime on 2/1/2019..

  


* Gabapentin at 100 mg three times daily was increased to 200 mg three times 

daily on 2/4/2019.  Monitor for improvement in symptoms.





Fatigue, likely due to poor sleep.  Nursing has changed her to another mattress 

which she reports feels more comfortable, 2/6/2019. 


* Fatigue is much improved with improved sleep.





Left knee pain.  X-ray 2/6/2019 shows moderate to severe medial compartment 

osteoarthritis and a knee effusion.


* Improved with better positioning in bed initiated by Physical therapy.


* Continue acetaminophen p.r.n. and hydrocodone/acetaminophen p.r.n.





Fluid overload seen in the hospital.  


* Has had furosemide 40 mg q.day for 4 days.  Weight is unchanged.  

Discontinued furosemide starting 2/8/2019.


* Check weight Tuesday Thursday and Saturday.





Hypertension.  Intermittently above target, on amlodipine 5 mg q.day and 

furosemide 40 mg q.day.


* Discontinue furosemide 2/8/2019.


* Initiate lisinopril at 10 mg q.day starting 2/8/2019.  She reports she was 

using it previously and had no adverse effects.  Repeat BMP on 2/13/2019.


* Had episode of lightheadedness 2/8/2019 but no hypotension was noted.  

Continue to monitor.





Hemorrhoids.  Initiate topical steroid preparation, 2/7/2019.





Peripheral vascular disease.  Continue aspirin. 





Skin lesions on the left toes, ischemic.  Seen by wound nurse.  They appear to 

be healing.  Continue painting with Betadine twice daily until eschar is 

resolved.





Hypomagnesemia.  Continue magnesium oxide.  Magnesium very slightly low on 2/1/ 2019.





Anemia in the hospital.  


* Also had significant iron deficiency, so initiated iron sulfate 325 mg q.day 

starting 2/1/2019.


* Stable on CBC 2/1/2019, with hemoglobin 8.3 and hematocrit 25.8.  Recheck 

hemoglobin and hematocrit 2/13/2019





Obesity and diabetes mellitus will merit a consult with the dietitian for 

optimal nutrition.  





Prophylaxis.  She has prolonged immobility and elevated risk for DVT.  Will 

initiate enoxaparin at 40 mg subcutaneous daily.  This can be discontinued if 

her mobility improves significantly or once she is 2-3 months out from the 

onset of her spinal cord injury.





DISPOSITION:  Discharge date tentatively set for 2/15/2019.  Pending progress, 

she may be appropriate to discharge to a skilled nursing facility for more 

prolonged rehabilitation.








02/08/19 11:37





Subjective: 





Slept okay overnight, but reports that she found "holes" in her new pressure 

relief mattress.  She feels she is able to engage her right leg better in 

attempts to transfer.  Had lightheadedness this morning when she setup.  No 

vertigo.  Otherwise without complaints.


Objective: 





 Vital Signs











Temp Pulse Resp BP Pulse Ox


 


 36.8 C   96   17   132/67 H  92 


 


 02/08/19 06:44  02/08/19 08:18  02/08/19 06:44  02/08/19 08:19  02/08/19 06:44








 Laboratory Results





 02/01/19 06:00 





 02/01/19 06:00 





 











 02/07/19 02/08/19 02/09/19





 05:59 05:59 05:59


 


Intake Total 440 990 


 


Output Total 8448 7224 


 


Balance -1300 -1465 














Physical Exam





- Physical Exam


General Appearance: WD/WN, alert, no apparent distress


Respiratory: No respiratory distress, No accessory muscle use


Skin: normal color, warm/dry


Neuro/Psych: alert, normal mood/affect, oriented x 3, motor weakness (Bilateral 

lower extremities)





ICD10 Worksheet


Patient Problems: 


 Problems











Problem Status Onset


 


Spinal cord stroke Acute

## 2019-02-09 RX ADMIN — GABAPENTIN SCH MG: 100 CAPSULE ORAL at 08:59

## 2019-02-09 RX ADMIN — LISINOPRIL SCH MG: 10 TABLET ORAL at 08:59

## 2019-02-09 RX ADMIN — HYDROCORTISONE PRN APP: 25 CREAM TOPICAL at 22:01

## 2019-02-09 RX ADMIN — DOCUSATE SODIUM AND SENNOSIDES SCH TAB: 50; 8.6 TABLET ORAL at 21:14

## 2019-02-09 RX ADMIN — INSULIN HUMAN SCH: 100 INJECTION, SUSPENSION SUBCUTANEOUS at 22:53

## 2019-02-09 RX ADMIN — DOCUSATE SODIUM AND SENNOSIDES SCH TAB: 50; 8.6 TABLET ORAL at 09:01

## 2019-02-09 RX ADMIN — GABAPENTIN SCH MG: 100 CAPSULE ORAL at 14:15

## 2019-02-09 RX ADMIN — ENOXAPARIN SODIUM SCH MG: 100 INJECTION SUBCUTANEOUS at 08:59

## 2019-02-09 RX ADMIN — GABAPENTIN SCH MG: 100 CAPSULE ORAL at 21:14

## 2019-02-09 RX ADMIN — ATORVASTATIN CALCIUM SCH MG: 20 TABLET, FILM COATED ORAL at 21:14

## 2019-02-09 RX ADMIN — Medication SCH MG: at 17:23

## 2019-02-09 RX ADMIN — LEVOTHYROXINE SODIUM SCH MCG: 75 TABLET ORAL at 05:55

## 2019-02-09 RX ADMIN — BISACODYL SCH MG: 10 SUPPOSITORY RECTAL at 15:56

## 2019-02-09 RX ADMIN — ACETAMINOPHEN PRN MG: 325 TABLET ORAL at 14:15

## 2019-02-09 RX ADMIN — ASPIRIN 81 MG SCH MG: 81 TABLET ORAL at 08:59

## 2019-02-09 RX ADMIN — IRON SUPPLEMENT SCH MG: 325 TABLET ORAL at 08:59

## 2019-02-09 RX ADMIN — ACETAMINOPHEN PRN MG: 325 TABLET ORAL at 21:15

## 2019-02-09 RX ADMIN — Medication SCH MG: at 09:00

## 2019-02-09 RX ADMIN — TRIAMCINOLONE ACETONIDE SCH: 1 OINTMENT TOPICAL at 21:18

## 2019-02-09 RX ADMIN — HYDROCODONE BITARTRATE AND ACETAMINOPHEN PRN TAB: 5; 325 TABLET ORAL at 15:55

## 2019-02-09 RX ADMIN — TRIAMCINOLONE ACETONIDE SCH APP: 1 OINTMENT TOPICAL at 09:04

## 2019-02-09 RX ADMIN — NORTRIPTYLINE HYDROCHLORIDE SCH MG: 25 CAPSULE ORAL at 21:14

## 2019-02-09 RX ADMIN — BISACODYL SCH MG: 10 SUPPOSITORY RECTAL at 17:25

## 2019-02-09 NOTE — SOAPPROG
SOAP Progress Note


Assessment/Plan: 


Assessment:





77 YO woman with Ischemic SCI





Spinal cord injury, T1 level, incomplete RYAN class C.  


* Initial functional independence measure was 52 on 2/4/2019.   


* Continue PT and OT to optimize her mobility and activities of daily living. 





Complications of spinal cord injury with neurogenic bowel, neurogenic bladder.  

She will be treated with the neurogenic bowel and bladder protocols.  


* Liu catheter was removed 2/1/2019.  She is having intermittent 

catheterization per nursing. Continue intermittent catheterization and as she 

developed stability and has hands free, she will participate more in self 

catheterization.


* Continue Bowel program   


* She is at risk for autonomic dysreflexia.  She has had no signs or symptoms.





Chronic conditions of dyslipidemia, hypothyroidism, and diabetes mellitus type 

2.  She will be continued on medications for these conditions.


* Initiate metformin 500 mg twice daily with meals beginning evening of 2/1/ 2019.  Increased to 1000 mg twice daily evening of 2/6/2019.





History of peripheral neuropathy.  She was treated with nortriptyline 10 mg at 

h.s.  As she also reports insomnia, increased to 25 mg at bedtime on 2/1/2019..

  


* Gabapentin at 100 mg three times daily was increased to 200 mg three times 

daily on 2/4/2019.  Monitor for improvement in symptoms.





Fatigue, likely due to poor sleep, and meds.  


* Fatigue is much improved with improved sleep.





Left knee pain.  X-ray 2/6/2019 shows moderate to severe medial compartment 

osteoarthritis and a knee effusion.


* Improved with better positioning in bed initiated by Physical therapy.


* Continue acetaminophen p.r.n. and hydrocodone/acetaminophen p.r.n.





Fluid overload seen in the hospital.  


* Has had furosemide 40 mg q.day for 4 days.  Weight is unchanged.  

Discontinued furosemide starting 2/8/2019. !+ edema L calf today 2/9.


* Check weight Tuesday Thursday and Saturday.





Hypertension.  Intermittently above target, on amlodipine 5 mg q.day and 

furosemide 40 mg q.day. 118/64 today


* Discontinue furosemide 2/8/2019.


* Initiate lisinopril at 10 mg q.day starting 2/8/2019.  She reports she was 

using it previously and had no adverse effects.  Repeat BMP on 2/13/2019.


* Had episode of lightheadedness 2/8/2019 but no hypotension was noted.  

Continue to monitor.





Hemorrhoids.  Initiate topical steroid preparation, 2/7/2019.





Peripheral vascular disease.  Continue aspirin. 





Skin lesions on the left toes, ischemic.  Per wound nurse, they appear to be 

healing.  Continue painting with Betadine twice daily until eschar is resolved.





Hypomagnesemia.  Continue magnesium oxide.  Magnesium very slightly low on 2/1/ 2019.





Anemia in the hospital.  


* Also had significant iron deficiency, so initiated iron sulfate 325 mg q.day 

starting 2/1/2019.


* Stable on CBC 2/1/2019, with hemoglobin 8.3 and hematocrit 25.8.  Recheck 

hemoglobin and hematocrit 2/13/2019





Obesity and diabetes mellitus will merit a consult with the dietitian for 

optimal nutrition.  





Prophylaxis.  She has prolonged immobility and elevated risk for DVT.  Cont 

enoxaparin at 40 mg subcutaneous daily.  This can be discontinued if her 

mobility improves significantly or once she is 2-3 months out from the onset of 

her spinal cord injury.





DISPOSITION:  Discharge date tentatively set for 2/15/2019.  Pending progress, 

she may be appropriate to discharge to a skilled nursing facility for more 

prolonged rehabilitation.




















Plan:  Cont Dr Holloway's rehab treatment plan





02/09/19 14:51





Subjective: 





No new problems or C/O's


Baseline pain still at times is significant and limiting her progress, 

participation and quality of life


No F/C/CP/SOB/N/V/D


RE-filled suppository order per nursing request


Objective: 





 Vital Signs











Temp Pulse Resp BP Pulse Ox


 


 36.9 C   94   14   118/64   94 


 


 02/09/19 05:56  02/09/19 05:56  02/09/19 05:56  02/09/19 05:56  02/09/19 05:56








 Laboratory Results





 02/01/19 06:00 





 02/01/19 06:00 





 











 02/08/19 02/09/19 02/10/19





 05:59 05:59 05:59


 


Intake Total 990 570 480


 


Output Total 2455 1100 450


 


Balance -1465 -530 30














Physical Exam





- Physical Exam


General Appearance: alert, no apparent distress


Neck: supple


Respiratory: lungs clear, crackles (l base)


Cardiac/Chest: regular rate, rhythm


Abdomen: normal bowel sounds, soft


Skin: normal color, warm/dry


Extremities: pedal edema (1+, L>R)


Neuro/Psych: alert, oriented x 3, motor weakness, sensory deficit, depressed 

affect, other (no acute changes)





ICD10 Worksheet


Patient Problems: 


 Problems











Problem Status Onset


 


Spinal cord stroke Acute

## 2019-02-10 RX ADMIN — HYDROCODONE BITARTRATE AND ACETAMINOPHEN PRN TAB: 5; 325 TABLET ORAL at 21:33

## 2019-02-10 RX ADMIN — Medication SCH MG: at 08:46

## 2019-02-10 RX ADMIN — LEVOTHYROXINE SODIUM SCH MCG: 75 TABLET ORAL at 05:46

## 2019-02-10 RX ADMIN — INSULIN HUMAN SCH UNITS: 100 INJECTION, SUSPENSION SUBCUTANEOUS at 21:29

## 2019-02-10 RX ADMIN — IRON SUPPLEMENT SCH MG: 325 TABLET ORAL at 08:47

## 2019-02-10 RX ADMIN — GABAPENTIN SCH MG: 100 CAPSULE ORAL at 17:27

## 2019-02-10 RX ADMIN — ACETAMINOPHEN PRN MG: 325 TABLET ORAL at 18:28

## 2019-02-10 RX ADMIN — LISINOPRIL SCH MG: 10 TABLET ORAL at 08:45

## 2019-02-10 RX ADMIN — GABAPENTIN SCH MG: 100 CAPSULE ORAL at 08:46

## 2019-02-10 RX ADMIN — GABAPENTIN SCH MG: 100 CAPSULE ORAL at 21:28

## 2019-02-10 RX ADMIN — Medication PRN APP: at 16:00

## 2019-02-10 RX ADMIN — DOCUSATE SODIUM AND SENNOSIDES SCH: 50; 8.6 TABLET ORAL at 11:31

## 2019-02-10 RX ADMIN — HYDROCORTISONE PRN APP: 25 CREAM TOPICAL at 21:35

## 2019-02-10 RX ADMIN — Medication PRN APP: at 21:30

## 2019-02-10 RX ADMIN — TRIAMCINOLONE ACETONIDE SCH APP: 1 OINTMENT TOPICAL at 21:29

## 2019-02-10 RX ADMIN — ENOXAPARIN SODIUM SCH MG: 100 INJECTION SUBCUTANEOUS at 08:41

## 2019-02-10 RX ADMIN — ATORVASTATIN CALCIUM SCH MG: 20 TABLET, FILM COATED ORAL at 21:28

## 2019-02-10 RX ADMIN — DOCUSATE SODIUM AND SENNOSIDES SCH TAB: 50; 8.6 TABLET ORAL at 08:46

## 2019-02-10 RX ADMIN — ASPIRIN 81 MG SCH MG: 81 TABLET ORAL at 08:45

## 2019-02-10 RX ADMIN — NORTRIPTYLINE HYDROCHLORIDE SCH MG: 25 CAPSULE ORAL at 21:28

## 2019-02-10 RX ADMIN — TRIAMCINOLONE ACETONIDE SCH APP: 1 OINTMENT TOPICAL at 09:27

## 2019-02-10 NOTE — SOAPPROG
SOAP Progress Note


Assessment/Plan: 


Assessment:





77 YO woman with Ischemic SCI





Spinal cord injury, T1 level, incomplete RYAN class C.  


* Initial functional independence measure was 52 on 2/4/2019.   


* Continue PT and OT to optimize her mobility and activities of daily living. 





Neurogenic bowel, neurogenic bladder.  


* Cont bowel and bladder protocols.  


* Cont intermittent catheterization per nursing. Query if patient will be 

capable of [place] with intermittent catheterization, as she lacks 

stability and confidence to progress with self catheterization.


* Continue Bowel program   


* She is at risk for autonomic dysreflexia.  She has had no signs or symptoms.





Chronic conditions of dyslipidemia, hypothyroidism, and diabetes mellitus type 

2.  She will be continued on medications for these conditions.


* Initiate metformin 500 mg twice daily with meals beginning evening of 2/1/ 2019.  Increased to 1000 mg twice daily evening of 2/6/2019.





History of peripheral neuropathy.  She was treated with nortriptyline 10 mg at 

h.s.  As she also reports insomnia, increased to 25 mg at bedtime on 2/1/2019..

  


* Gabapentin at 100 mg three times daily was increased to 200 mg three times 

daily on 2/4/2019.  Monitor for improvement in symptoms.





Fatigue, likely due to poor sleep, and meds.  


* Fatigue is much improved with improved sleep.





Left knee pain.  X-ray 2/6/2019 shows moderate to severe medial compartment 

osteoarthritis and a knee effusion.


* Improved with better positioning in bed initiated by Physical therapy.


* Continue acetaminophen p.r.n. and hydrocodone/acetaminophen p.r.n.





Fluid overload seen in the hospital.  


* Had furosemide 40 mg q.day for 4 days.  Weight was unchanged.  Discontinued 

furosemide  2/8/2019. 1+ edema L calf 2/9, none seen 2/10. Monitor Crackles L 

base.


* Check weight Tuesday Thursday and Saturday.





Hypertension.  Intermittently above target, on amlodipine 5 mg q.day and 

furosemide 40 mg q.day. 118/64 today


* Discontinue furosemide 2/8/2019.


* Initiate lisinopril at 10 mg q.day starting 2/8/2019.  She reports she was 

using it previously and had no adverse effects.  Repeat BMP on 2/13/2019.


* Had episode of lightheadedness 2/8/2019 but no hypotension was noted.  

Continue to monitor.





Hemorrhoids.  Initiate topical steroid preparation, 2/7/2019.  Add desitin to 

promote healing.





Peripheral vascular disease.  Continue aspirin. 





Skin lesions on the left toes, ischemic.  Per wound nurse, they appear to be 

healing.  Continue painting with Betadine twice daily until eschar is resolved.





Hypomagnesemia.  Continue magnesium oxide.  Magnesium very slightly low on 2/1/ 2019.





Anemia in the hospital.  


* Also had significant iron deficiency, so initiated iron sulfate 325 mg q.day 

starting 2/1/2019.


* Stable on CBC 2/1/2019, with hemoglobin 8.3 and hematocrit 25.8.  Recheck 

hemoglobin and hematocrit 2/13/2019





Obesity and diabetes mellitus will merit a consult with the dietitian for 

optimal nutrition.  





Prophylaxis. Cont enoxaparin at 40 mg subcutaneous daily 2/2 prolonged 

immobility and elevated risk for DVT.    This can be discontinued if her 

mobility improves significantly or once she is 2-3 months out from the onset of 

her spinal cord injury.





DISPOSITION:  Discharge date tentatively set for 2/15/2019.  Pending progress, 

she may be appropriate to discharge to a skilled nursing facility for more 

prolonged rehabilitation.




















Plan:  Cont Dr Holloway's rehab treatment plan





02/10/19 13:51





Subjective: 





Very uncomfortable in specialty care mattress, at her request we will transfer 

back to regular matress.  Monitor skin. Cont with frequent turns through the 

night.


Loose stools.  Need to hold bowel meds.


No F/C/CP/SOB/N/V/ 


Objective: 





 Vital Signs











Temp Pulse Resp BP Pulse Ox


 


 36.6 C   91   16   127/68 H  96 


 


 02/10/19 06:30  02/10/19 06:30  02/10/19 06:30  02/10/19 08:46  02/10/19 06:30








 Laboratory Results





 02/01/19 06:00 





 02/01/19 06:00 





 











 02/09/19 02/10/19 02/11/19





 05:59 05:59 05:59


 


Intake Total 570 980 200


 


Output Total 1100 1375 300


 


Balance -530 -395 -100














Physical Exam





- Physical Exam


General Appearance: alert


Neck: supple


Respiratory: crackles (L base)


Cardiac/Chest: regular rate, rhythm


Rectal: hemorrhoids


Skin: normal color, warm/dry


Extremities: No pedal edema, No calf tenderness


Neuro/Psych: alert, normal mood/affect, oriented x 3, motor weakness, sensory 

deficit, other (no acute changes), No cognition abnormalities





ICD10 Worksheet


Patient Problems: 


 Problems











Problem Status Onset


 


Spinal cord stroke Acute

## 2019-02-11 RX ADMIN — IRON SUPPLEMENT SCH MG: 325 TABLET ORAL at 08:26

## 2019-02-11 RX ADMIN — ACETAMINOPHEN PRN MG: 325 TABLET ORAL at 14:45

## 2019-02-11 RX ADMIN — TRIAMCINOLONE ACETONIDE SCH APP: 1 OINTMENT TOPICAL at 20:38

## 2019-02-11 RX ADMIN — DOCUSATE SODIUM AND SENNOSIDES SCH TAB: 50; 8.6 TABLET ORAL at 20:38

## 2019-02-11 RX ADMIN — GABAPENTIN SCH MG: 100 CAPSULE ORAL at 20:36

## 2019-02-11 RX ADMIN — Medication PRN APP: at 20:38

## 2019-02-11 RX ADMIN — GABAPENTIN SCH MG: 100 CAPSULE ORAL at 08:25

## 2019-02-11 RX ADMIN — ENOXAPARIN SODIUM SCH MG: 100 INJECTION SUBCUTANEOUS at 08:25

## 2019-02-11 RX ADMIN — INSULIN HUMAN SCH UNITS: 100 INJECTION, SUSPENSION SUBCUTANEOUS at 20:38

## 2019-02-11 RX ADMIN — HYDROCODONE BITARTRATE AND ACETAMINOPHEN PRN TAB: 5; 325 TABLET ORAL at 20:43

## 2019-02-11 RX ADMIN — NORTRIPTYLINE HYDROCHLORIDE SCH MG: 25 CAPSULE ORAL at 20:36

## 2019-02-11 RX ADMIN — GABAPENTIN SCH MG: 100 CAPSULE ORAL at 14:46

## 2019-02-11 RX ADMIN — HYDROCORTISONE PRN APP: 25 CREAM TOPICAL at 20:38

## 2019-02-11 RX ADMIN — ATORVASTATIN CALCIUM SCH MG: 20 TABLET, FILM COATED ORAL at 20:37

## 2019-02-11 RX ADMIN — LISINOPRIL SCH MG: 10 TABLET ORAL at 08:26

## 2019-02-11 RX ADMIN — TRIAMCINOLONE ACETONIDE SCH APP: 1 OINTMENT TOPICAL at 08:26

## 2019-02-11 RX ADMIN — ASPIRIN 81 MG SCH MG: 81 TABLET ORAL at 08:26

## 2019-02-11 RX ADMIN — LEVOTHYROXINE SODIUM SCH MCG: 75 TABLET ORAL at 06:01

## 2019-02-11 NOTE — SOAPPROG
SOAP Progress Note


Assessment/Plan: 


Assessment:





Spinal cord injury, T1 level, incomplete RYAN class C.  


* Initial functional independence measure was 52 on 2/4/2019, improved to 59 as 

of 2/11/2019.  Maximal assist for transfers, bed mobility.  Self-propelled 

wheelchair 90 ft with standby assist.  Standby assist for grooming and hygiene, 

upper body dressing and bathing.  Maximal assist for bath and toilet transfers.

  Total assist for toileting aftercare..  She does  with standby assist.  


* Continue PT and OT to optimize her mobility and activities of daily living. 





Complications of spinal cord injury with neurogenic bowel, neurogenic bladder.  

She will be treated with the neurogenic bowel and bladder protocols.  


* Liu catheter was removed 2/1/2019.  She is having intermittent 

catheterization per nursing.  She is unable to participate herself yet as she 

does not have those stability to be seated upright in order to visualize the 

perineum and use her arms and hands.  Discussed with patient 2/11/2019.  Will 

place Liu catheter until she develops sufficient upper body stability to 

learn how to self cath.





Neurogenic bowel


* Over-treatment with laxatives.  Received senna 7 tabs over 3 days prior to 

having 5 bowel movements in 1 day.


* Continue Senna 1 tab daily.


* Continue neurogenic bowel protocol with bisacodyl QD at 1600 and digital 

stimulation as needed.





Chronic conditions of dyslipidemia, hypothyroidism, and diabetes mellitus type 

2.  She will be continued on medications for these conditions.


* Initiate metformin 500 mg twice daily with meals beginning evening of 2/1/ 2019.  Increased to 1000 mg twice daily evening of 2/6/2019.





History of peripheral neuropathy.  She was treated with nortriptyline 10 mg at 

h.s.  As she also reports insomnia, increased to 25 mg at bedtime on 2/1/2019..

  


* Gabapentin at 100 mg three times daily was increased to 200 mg three times 

daily on 2/4/2019.  


* Has worsening lower extremity neuropathic symptoms.  Increase gabapentin to 

300 mg three times daily starting 2/11/2019.





Fatigue, likely due to poor sleep.  Nursing has changed her to another mattress 

which she reports feels more comfortable, 2/6/2019. 


* Fatigue is much improved with improved sleep.





Left knee pain.  X-ray 2/6/2019 shows moderate to severe medial compartment 

osteoarthritis and a knee effusion.


* Improved with better positioning in bed initiated by Physical therapy.


* Continue acetaminophen p.r.n. and hydrocodone/acetaminophen p.r.n.





Fluid overload seen in the hospital.  


* Has had furosemide 40 mg q.day for 4 days.  Weight is unchanged.  

Discontinued furosemide starting 2/8/2019.


* Check weight Tuesday Thursday and Saturday.





Hypertension.  Intermittently above target, on amlodipine 5 mg q.day and 

furosemide 40 mg q.day.


* Discontinue furosemide 2/8/2019.


* Initiate lisinopril at 10 mg q.day starting 2/8/2019.  She reports she was 

using it previously and had no adverse effects.  Repeat BMP on 2/13/2019.


* Had episode of lightheadedness 2/8/2019 but no hypotension was noted.  

Continue to monitor.





Hemorrhoids.  Initiate topical steroid preparation, 2/7/2019.





Peripheral vascular disease.  Continue aspirin. 





Skin lesions on the left toes, ischemic.  Seen by wound nurse.  They appear to 

be healing.  Continue painting with Betadine twice daily until eschar is 

resolved.





Hypomagnesemia.  Continue magnesium oxide.  Magnesium very slightly low on 2/1/ 2019.





Anemia in the hospital.  


* Also had significant iron deficiency, so initiated iron sulfate 325 mg q.day 

starting 2/1/2019.


* Stable on CBC 2/1/2019, with hemoglobin 8.3 and hematocrit 25.8.  Recheck 

hemoglobin and hematocrit 2/13/2019





Obesity and diabetes mellitus will merit a consult with the dietitian for 

optimal nutrition.  





Prophylaxis.  She has prolonged immobility and elevated risk for DVT.  Will 

initiate enoxaparin at 40 mg subcutaneous daily.  This can be discontinued if 

her mobility improves significantly or once she is 2-3 months out from the 

onset of her spinal cord injury.





DISPOSITION:  Attended staffing, 15 min.  Discussed with case management, 

dietitian, nursing, PT, OT.  Goal is to achieve one-person assist level for 

mobility and ADLs.  Discharge date set for 2/22/2019.  Will not discharge home 

as she does not have enough help available.  May discharge to home of nearby 

relatives.





02/11/19 13:07





Subjective: 





Slept better on regular mattress.  Did not tolerate specialty mattresses.  

Reports improved ability to move her legs in and out of bed.  Had 5 bowel 

movements yesterday after receiving laxatives for several days.  No abdominal 

pain, no fevers or chills, no nausea or vomiting.  Has worsening nerve pain in 

legs and requests increased gabapentin.


Objective: 





 Vital Signs











Temp Pulse Resp BP Pulse Ox


 


 36.8 C   102 H  18   114/49 L  92 


 


 02/11/19 08:00  02/11/19 08:00  02/11/19 08:00  02/11/19 08:26  02/11/19 08:00








 Laboratory Results





 02/01/19 06:00 





 02/01/19 06:00 





 











 02/10/19 02/11/19 02/12/19





 05:59 05:59 05:59


 


Intake Total 980 1640 476


 


Output Total 1375 1425 


 


Balance -395 215 476














- Time Spent With Patient


Time Spent With Patient: 





Greater than 35 min floor time today, including more than 50% of time in 

coordination of care during staffing meeting, and counseling patient.





Physical Exam





- Physical Exam


General Appearance: WD/WN, alert, no apparent distress, obese


Respiratory: No respiratory distress, No accessory muscle use


Cardiac/Chest: edema (Trace bilateral pretibial)


Skin: normal color, warm/dry


Neuro/Psych: alert, normal mood/affect, oriented x 3, motor weakness (Bilateral 

lower extremities)





ICD10 Worksheet


Patient Problems: 


 Problems











Problem Status Onset


 


Spinal cord stroke Acute

## 2019-02-12 RX ADMIN — IRON SUPPLEMENT SCH MG: 325 TABLET ORAL at 08:32

## 2019-02-12 RX ADMIN — DOCUSATE SODIUM AND SENNOSIDES SCH TAB: 50; 8.6 TABLET ORAL at 21:22

## 2019-02-12 RX ADMIN — INSULIN HUMAN SCH UNITS: 100 INJECTION, SUSPENSION SUBCUTANEOUS at 21:23

## 2019-02-12 RX ADMIN — ASPIRIN 81 MG SCH MG: 81 TABLET ORAL at 08:31

## 2019-02-12 RX ADMIN — LISINOPRIL SCH MG: 10 TABLET ORAL at 08:32

## 2019-02-12 RX ADMIN — GABAPENTIN SCH MG: 100 CAPSULE ORAL at 15:44

## 2019-02-12 RX ADMIN — ENOXAPARIN SODIUM SCH MG: 100 INJECTION SUBCUTANEOUS at 08:31

## 2019-02-12 RX ADMIN — ATORVASTATIN CALCIUM SCH MG: 20 TABLET, FILM COATED ORAL at 21:22

## 2019-02-12 RX ADMIN — GABAPENTIN SCH MG: 100 CAPSULE ORAL at 21:22

## 2019-02-12 RX ADMIN — GABAPENTIN SCH MG: 100 CAPSULE ORAL at 08:31

## 2019-02-12 RX ADMIN — LEVOTHYROXINE SODIUM SCH MCG: 75 TABLET ORAL at 06:43

## 2019-02-12 RX ADMIN — TRIAMCINOLONE ACETONIDE SCH APP: 1 OINTMENT TOPICAL at 09:03

## 2019-02-12 RX ADMIN — ACETAMINOPHEN PRN MG: 325 TABLET ORAL at 08:37

## 2019-02-12 RX ADMIN — NORTRIPTYLINE HYDROCHLORIDE SCH MG: 25 CAPSULE ORAL at 21:22

## 2019-02-12 RX ADMIN — TRIAMCINOLONE ACETONIDE SCH APP: 1 OINTMENT TOPICAL at 21:23

## 2019-02-12 RX ADMIN — HYDROCODONE BITARTRATE AND ACETAMINOPHEN PRN TAB: 5; 325 TABLET ORAL at 14:25

## 2019-02-12 NOTE — SOAPPROG
SOAP Progress Note


Assessment/Plan: 


Assessment:





Spinal cord injury, T1 level, incomplete RYAN class C.  


* Initial functional independence measure was 52 on 2/4/2019, improved to 59 as 

of 2/11/2019.  Maximal assist for transfers, bed mobility.  Self-propelled 

wheelchair 90 ft with standby assist.  Standby assist for grooming and hygiene, 

upper body dressing and bathing.  Maximal assist for bath and toilet transfers.

  Total assist for toileting aftercare..  


* Continue PT and OT to optimize her mobility and activities of daily living. 





Complications of spinal cord injury with neurogenic bowel, neurogenic bladder.  

She will be treated with the neurogenic bowel and bladder protocols.  


* Liu catheter was removed 2/1/2019.  She is having intermittent 

catheterization per nursing.  She is unable to participate herself yet as she 

does not have those stability to be seated upright in order to visualize the 

perineum and use her arms and hands.  Discussed with patient 2/11/2019.  

Replaced Liu catheter until she develops sufficient upper body stability to 

learn how to self cath.





Neurogenic bowel


* Over-treatment with laxatives.  Received senna 7 tabs over 3 days prior to 

having 5 bowel movements in 1 day.


* Continue Senna 1 tab daily.


* Continue neurogenic bowel protocol with bisacodyl QD at 1600 and digital 

stimulation as needed.





Chronic conditions of dyslipidemia, hypothyroidism, and diabetes mellitus type 

2.  She will be continued on medications for these conditions.


* Initiate metformin 500 mg twice daily with meals beginning evening of 2/1/ 2019.  Increased to 1000 mg twice daily evening of 2/6/2019.





History of peripheral neuropathy.  She was treated with nortriptyline 10 mg at 

h.s.  As she also reports insomnia, increased to 25 mg at bedtime on 2/1/2019..

  


* Gabapentin at 100 mg three times daily was increased to 200 mg three times 

daily on 2/4/2019.  


* Has worsening lower extremity neuropathic symptoms.  Increase gabapentin to 

300 mg three times daily starting 2/12/2019.





Fatigue, likely due to poor sleep.  Nursing has changed her to another mattress 

which she reports feels more comfortable, 2/6/2019. 


* Fatigue is much improved with improved sleep.





Left knee pain.  X-ray 2/6/2019 shows moderate to severe medial compartment 

osteoarthritis and a knee effusion.


* Improved with better positioning in bed initiated by Physical therapy.


* Continue acetaminophen p.r.n. and hydrocodone/acetaminophen p.r.n.





Fluid overload seen in the hospital.  


* Has had furosemide 40 mg q.day for 4 days.  Weight is unchanged.  

Discontinued furosemide starting 2/8/2019.


* Check weight Tuesday Thursday and Saturday.





Hypertension.  Intermittently above target, on amlodipine 5 mg q.day and 

furosemide 40 mg q.day.


* Discontinue furosemide 2/8/2019.


* Initiate lisinopril at 10 mg q.day starting 2/8/2019.  She reports she was 

using it previously and had no adverse effects.  Repeat BMP on 2/13/2019.


* Had episode of lightheadedness 2/8/2019 but no hypotension was noted.  

Continue to monitor.





Hemorrhoids.  Initiated topical steroid preparation, 2/7/2019.





Peripheral vascular disease.  Continue aspirin. 





Skin lesions on the left toes, ischemic.  Seen by wound nurse.  They appear to 

be healing.  Continue painting with Betadine twice daily until eschar is 

resolved.





Hypomagnesemia.  Continue magnesium oxide.  Magnesium very slightly low on 2/1/ 2019.





Anemia in the hospital.  


* Also had significant iron deficiency, so initiated iron sulfate 325 mg q.day 

starting 2/1/2019.


* Stable on CBC 2/1/2019, with hemoglobin 8.3 and hematocrit 25.8.  Recheck 

hemoglobin and hematocrit 2/13/2019





Obesity and diabetes mellitus will merit a consult with the dietitian for 

optimal nutrition.  





Prophylaxis.  She has prolonged immobility and elevated risk for DVT.  Will 

initiate enoxaparin at 40 mg subcutaneous daily.  This can be discontinued if 

her mobility improves significantly or once she is 2-3 months out from the 

onset of her spinal cord injury.





DISPOSITION: Goal is to achieve one-person assist level for mobility and ADLs.  

Discharge date set for 2/22/2019.  Will not discharge home as she does not have 

enough help available.  May discharge to home of nearby relatives.








02/12/19 16:49





Subjective: 





Reports that she was able to transfer more easily x2 today.  Also better able 

to turn herself in bed.  No longer having diarrhea.  Otherwise without 

complaints.


Objective: 





 Vital Signs











Temp Pulse Resp BP Pulse Ox


 


 36.9 C   100   19   119/60   92 


 


 02/12/19 08:00  02/12/19 08:00  02/12/19 08:00  02/12/19 08:32  02/12/19 08:00








 Laboratory Results





 02/01/19 06:00 





 02/01/19 06:00 





 











 02/11/19 02/12/19 02/13/19





 05:59 05:59 05:59


 


Intake Total 1640 1836 600


 


Output Total 1425 825 75


 


Balance 215 1011 525














Physical Exam





- Physical Exam


General Appearance: WD/WN, alert, no apparent distress, obese


Respiratory: No respiratory distress, No accessory muscle use


Cardiac/Chest: edema (1+ bilateral pretibial)


Skin: normal color, warm/dry


Neuro/Psych: alert, normal mood/affect, oriented x 3, motor weakness (Strength 

3 to 4/5 on the right foot flexion and extension.  2/5 in the right quadriceps.

  On the left leg she has muscle activation in the quadriceps and for foot 

dorsiflexion and plantar flexion but motor strength is 1/5.)





ICD10 Worksheet


Patient Problems: 


 Problems











Problem Status Onset


 


Spinal cord stroke Acute

## 2019-02-13 LAB — PLATELET # BLD: 232 10^3/UL (ref 150–400)

## 2019-02-13 RX ADMIN — INSULIN HUMAN SCH UNITS: 100 INJECTION, SUSPENSION SUBCUTANEOUS at 21:11

## 2019-02-13 RX ADMIN — IRON SUPPLEMENT SCH MG: 325 TABLET ORAL at 08:17

## 2019-02-13 RX ADMIN — GABAPENTIN SCH MG: 100 CAPSULE ORAL at 08:16

## 2019-02-13 RX ADMIN — DOCUSATE SODIUM AND SENNOSIDES SCH TAB: 50; 8.6 TABLET ORAL at 20:58

## 2019-02-13 RX ADMIN — ASPIRIN 81 MG SCH MG: 81 TABLET ORAL at 08:17

## 2019-02-13 RX ADMIN — TRIAMCINOLONE ACETONIDE SCH APP: 1 OINTMENT TOPICAL at 21:00

## 2019-02-13 RX ADMIN — HYDROCODONE BITARTRATE AND ACETAMINOPHEN PRN TAB: 5; 325 TABLET ORAL at 16:10

## 2019-02-13 RX ADMIN — ENOXAPARIN SODIUM SCH MG: 100 INJECTION SUBCUTANEOUS at 08:16

## 2019-02-13 RX ADMIN — LEVOTHYROXINE SODIUM SCH MCG: 75 TABLET ORAL at 05:05

## 2019-02-13 RX ADMIN — TRIAMCINOLONE ACETONIDE SCH APP: 1 OINTMENT TOPICAL at 08:18

## 2019-02-13 RX ADMIN — GABAPENTIN SCH MG: 100 CAPSULE ORAL at 15:45

## 2019-02-13 RX ADMIN — NORTRIPTYLINE HYDROCHLORIDE SCH MG: 25 CAPSULE ORAL at 20:58

## 2019-02-13 RX ADMIN — GABAPENTIN SCH MG: 100 CAPSULE ORAL at 21:03

## 2019-02-13 RX ADMIN — ATORVASTATIN CALCIUM SCH MG: 20 TABLET, FILM COATED ORAL at 20:58

## 2019-02-13 RX ADMIN — Medication SCH MG: at 08:16

## 2019-02-13 RX ADMIN — Medication SCH MG: at 17:26

## 2019-02-13 RX ADMIN — LISINOPRIL SCH MG: 10 TABLET ORAL at 08:16

## 2019-02-13 NOTE — SOAPPROG
SOAP Progress Note


Assessment/Plan: 


Assessment:





Spinal cord injury, T1 level, incomplete RYAN class C.  


* Initial functional independence measure was 52 on 2/4/2019, improved to 59 as 

of 2/11/2019.  Maximal assist for transfers, bed mobility.  Self-propelled 

wheelchair 90 ft with standby assist.  Standby assist for grooming and hygiene, 

upper body dressing and bathing.  Maximal assist for bath and toilet transfers.

  Total assist for toileting aftercare..  


* Continue PT and OT to optimize her mobility and activities of daily living. 





Complications of spinal cord injury with neurogenic bowel, neurogenic bladder.  

She will be treated with the neurogenic bowel and bladder protocols.  


* Liu catheter was removed 2/1/2019.  She is having intermittent 

catheterization per nursing.  She is unable to participate herself yet as she 

does not have those stability to be seated upright in order to visualize the 

perineum and use her arms and hands.  Discussed with patient 2/11/2019.  

Replaced Liu catheter until she develops sufficient upper body stability to 

learn how to self cath.





Neurogenic bowel


* Over-treatment with laxatives.  Received senna 7 tabs over 3 days prior to 

having 5 bowel movements in 1 day.


* Continue Senna 1 tab daily.


* Continue neurogenic bowel protocol with bisacodyl QD at 1600 and digital 

stimulation as needed.





Chronic conditions of dyslipidemia, hypothyroidism, and diabetes mellitus type 

2.  She will be continued on medications for these conditions.


* Initiate metformin 500 mg twice daily with meals beginning evening of 2/1/ 2019.  Increased to 1000 mg twice daily evening of 2/6/2019.





History of peripheral neuropathy.  She was treated with nortriptyline 10 mg at 

h.s.  As she also reports insomnia, increased to 25 mg at bedtime on 2/1/2019..

  


* Gabapentin at 100 mg three times daily was increased to 200 mg three times 

daily on 2/4/2019.  


* Has worsening lower extremity neuropathic symptoms.  Increased gabapentin to 

300 mg three times daily starting 2/12/2019.





Fatigue, likely due to poor sleep.  Nursing has changed her to another mattress 

which she reports feels more comfortable, 2/6/2019. 


* Fatigue is much improved with improved sleep.





Left knee pain.  X-ray 2/6/2019 shows moderate to severe medial compartment 

osteoarthritis and a knee effusion.


* Improved with better positioning in bed initiated by Physical therapy.


* Continue acetaminophen p.r.n. and hydrocodone/acetaminophen p.r.n.





Fluid overload seen in the hospital.  


* Has had furosemide 40 mg q.day for 4 days.  Weight is unchanged.  

Discontinued furosemide starting 2/8/2019.


* Check weight Tuesday Thursday and Saturday.





Hypertension.  Intermittently above target, on amlodipine 5 mg q.day and 

furosemide 40 mg q.day.


* Discontinue furosemide 2/8/2019.


* Initiate lisinopril at 10 mg q.day starting 2/8/2019.  She reports she was 

using it previously and had no adverse effects.  Repeat BMP on 2/13/2019 with 

normal renal function.


* Had episode of lightheadedness 2/8/2019 but no hypotension was noted.  

Continue to monitor.





Hemorrhoids.  Initiated topical steroid preparation, 2/7/2019.





Peripheral vascular disease.  Continue aspirin. 





Skin lesions on the left toes, ischemic.  Seen by wound nurse.  They appear to 

be healing.  Continue painting with Betadine twice daily until eschar is 

resolved.





Hypomagnesemia.  Continue magnesium oxide.  Magnesium very slightly low on 2/1/ 2019.





Anemia in the hospital.  


* Also had significant iron deficiency, so initiated iron sulfate 325 mg q.day 

starting 2/1/2019.


* Stable on CBC 2/1/2019, with hemoglobin 8.3 and hematocrit 25.8.  Improving 

on CBC 2/13/2019.





Obesity and diabetes mellitus will merit a consult with the dietitian for 

optimal nutrition.  





Prophylaxis.  She has prolonged immobility and elevated risk for DVT.  Will 

initiate enoxaparin at 40 mg subcutaneous daily.  This can be discontinued if 

her mobility improves significantly or once she is 2-3 months out from the 

onset of her spinal cord injury.





DISPOSITION: Goal is to achieve one-person assist level for mobility and ADLs.  

Discharge date set for 2/22/2019.  Will not discharge home as she does not have 

enough help available.  May discharge to home of nearby relatives.





02/13/19 12:38





Subjective: 





Sleep was interrupted for repositioning but overall she feels she had adequate 

sleep.  She is not in pain.  No cough or dyspnea.  Neuropathic symptoms on the 

lower legs are improved with increased dose of gabapentin.


Objective: 





 Vital Signs











Temp Pulse Resp BP Pulse Ox


 


 36.9 C   110 H  18   126/58 H  92 


 


 02/13/19 05:55  02/13/19 08:00  02/13/19 08:00  02/13/19 08:16  02/13/19 08:00








 Laboratory Results





 02/13/19 06:00 





 02/13/19 06:00 





 











 02/12/19 02/13/19 02/14/19





 05:59 05:59 05:59


 


Intake Total 1836 1000 360


 


Output Total 825 1375 


 


Balance 1011 -375 360














Physical Exam





- Physical Exam


General Appearance: WD/WN, alert, no apparent distress


Respiratory: No respiratory distress, No accessory muscle use


Cardiac/Chest: edema (1+ bilateral lower extremity)


Skin: normal color, warm/dry


Neuro/Psych: alert, normal mood/affect, oriented x 3, motor weakness





ICD10 Worksheet


Patient Problems: 


 Problems











Problem Status Onset


 


Spinal cord stroke Acute

## 2019-02-14 RX ADMIN — ENOXAPARIN SODIUM SCH MG: 100 INJECTION SUBCUTANEOUS at 09:15

## 2019-02-14 RX ADMIN — HYDROCORTISONE PRN APP: 25 CREAM TOPICAL at 09:30

## 2019-02-14 RX ADMIN — LISINOPRIL SCH MG: 10 TABLET ORAL at 09:14

## 2019-02-14 RX ADMIN — ACETAMINOPHEN PRN MG: 325 TABLET ORAL at 18:17

## 2019-02-14 RX ADMIN — LEVOTHYROXINE SODIUM SCH MCG: 75 TABLET ORAL at 05:47

## 2019-02-14 RX ADMIN — GABAPENTIN SCH MG: 100 CAPSULE ORAL at 09:15

## 2019-02-14 RX ADMIN — IRON SUPPLEMENT SCH MG: 325 TABLET ORAL at 09:14

## 2019-02-14 RX ADMIN — ASPIRIN 81 MG SCH MG: 81 TABLET ORAL at 09:15

## 2019-02-14 RX ADMIN — ACETAMINOPHEN PRN MG: 325 TABLET ORAL at 09:24

## 2019-02-14 RX ADMIN — INSULIN HUMAN SCH UNITS: 100 INJECTION, SUSPENSION SUBCUTANEOUS at 20:57

## 2019-02-14 RX ADMIN — HYDROCODONE BITARTRATE AND ACETAMINOPHEN PRN TAB: 5; 325 TABLET ORAL at 18:17

## 2019-02-14 RX ADMIN — GABAPENTIN SCH MG: 100 CAPSULE ORAL at 16:55

## 2019-02-14 RX ADMIN — DOCUSATE SODIUM AND SENNOSIDES SCH TAB: 50; 8.6 TABLET ORAL at 20:57

## 2019-02-14 RX ADMIN — TRIAMCINOLONE ACETONIDE SCH: 1 OINTMENT TOPICAL at 20:59

## 2019-02-14 RX ADMIN — ATORVASTATIN CALCIUM SCH MG: 20 TABLET, FILM COATED ORAL at 20:57

## 2019-02-14 RX ADMIN — GABAPENTIN SCH MG: 100 CAPSULE ORAL at 20:57

## 2019-02-14 RX ADMIN — NORTRIPTYLINE HYDROCHLORIDE SCH MG: 25 CAPSULE ORAL at 20:58

## 2019-02-14 RX ADMIN — Medication SCH MG: at 18:17

## 2019-02-14 RX ADMIN — TRIAMCINOLONE ACETONIDE SCH APP: 1 OINTMENT TOPICAL at 21:00

## 2019-02-14 RX ADMIN — Medication SCH MG: at 09:15

## 2019-02-14 NOTE — SOAPPROG
SOAP Progress Note


Assessment/Plan: 


78-year-old woman status post spinal cord injury infarct after surgery 

involving her abdominal aorta and renal arteries, reported level is T1, 

incomplete RYAN C spinal cord injury with relatively preserved sensation 

compared to motor function.  Impairments in mobility and self-care.





Today's update:  Discussed multiple issues with her in-depth including 

neurogenic bowel and bladder.  Currently she failed a voiding trial and has a 

Liu catheter which may be appropriate for intermediate term management, she 

would likely discharge home with a Liu with follow-up with rehabilitation and 

Urology.  Additionally, neurogenic bowel program has not been particularly 

effective for her, discussed with team and reinforced regularity.  Getting an 

abdominal film to assess for impacted stool.  Regarding neuropathic pain, 

increasing gabapentin slightly from 300 mg 3 times a day to 300 mg 4 times a 

day.  Increasing insulin to 20 units daily from 18, monitoring closely.  

Patient was provided with a "yes you can" manual for spinal cord education.





A total of 35 min was spent on the floor in the care of the patient, the 

majority of which was spent counseling coordination of care regarding sequelae 

of spinal cord injury and prevention of complications.





Additional issues reviewed without change include dyslipidemia, hypothyroidism, 

fatigue, left knee pain, fluid overload, hypertension, hemorrhoids, peripheral 

vascular disease, skin lesions on toes, hypomagnesemia, anemia in the hospital, 

obesity, and DVT prophylaxis.





02/14/19 13:29





Subjective: 





Chief complaint:  Irregular bowel movements





No acute events overnight.  Patient denies any new shortness of breath or chest 

pain, no new numbness, tingling, or weakness.  She endorses frustration with 

her bowel movements and notes that she has some days where she has several 

bowel movements and then other days where she has none.  She does not feel like 

she has had response to suppositories, but does feel like she has been 

responsive to digital stimulation.  She can feel when she needs to have a bowel 

movement when stool enters the rectal vault.  She feels that that point it is 

an urgent issue.  Denies incontinence.  She failed a voiding trial recently and 

currently has a Liu for bladder management.  She has done intermittent 

catheterization in the past approximately 20 years ago, does not feel that she 

could doing very well at this point.  Staff feels that she is unable to manage 

her clothing well enough to do intermittent catheterization reliably.  She also 

endorses ongoing neuropathic leg pain, improved with gabapentin but still 

present.  Discussed bowel program with staff, they feel that a 6:00 a.m. Bowel 

program would be reasonable.


Objective: 





 Vital Signs











Temp Pulse Resp BP Pulse Ox


 


 37.0 C   106 H  16   131/62 H  93 


 


 02/14/19 05:47  02/14/19 05:47  02/14/19 05:47  02/14/19 09:26  02/14/19 05:47








 Laboratory Results





 02/13/19 06:00 





 02/13/19 06:00 





 











 02/13/19 02/14/19 02/15/19





 05:59 05:59 05:59


 


Intake Total 1000 900 


 


Output Total 1375 1350 


 


Balance -375 -450 














Physical Exam





- Physical Exam


General Appearance: WD/WN, alert, no apparent distress


EENT: No scleral icterus (R), No scleral icterus (L)


Respiratory: normal breath sounds, No respiratory distress


Cardiac/Chest: normal peripheral pulses, regular rate, rhythm, No edema


Abdomen: non-tender, distended


Rectal: other (Sensation present, trace voluntary sphincter contraction), No 

normal rectal tone (Slightly low, does have a closed sphincter), No mass, No 

tenderness


Skin: normal color, warm/dry, No cyanosis, No diaphoresis


Extremities: No pedal edema, No swelling


Neuro/Psych: alert, normal mood/affect, oriented x 3, other (Paraplegia, 

detailed exam not performed today regarding motor function.  She endorsed 

relatively preserved sensory function)





ICD10 Worksheet


Patient Problems: 


 Problems











Problem Status Onset


 


Spinal cord stroke Acute

## 2019-02-15 RX ADMIN — Medication SCH MG: at 17:34

## 2019-02-15 RX ADMIN — GABAPENTIN SCH MG: 100 CAPSULE ORAL at 17:34

## 2019-02-15 RX ADMIN — ATORVASTATIN CALCIUM SCH MG: 20 TABLET, FILM COATED ORAL at 21:22

## 2019-02-15 RX ADMIN — IRON SUPPLEMENT SCH MG: 325 TABLET ORAL at 09:14

## 2019-02-15 RX ADMIN — GABAPENTIN SCH MG: 100 CAPSULE ORAL at 11:50

## 2019-02-15 RX ADMIN — ACETAMINOPHEN PRN MG: 325 TABLET ORAL at 05:34

## 2019-02-15 RX ADMIN — NORTRIPTYLINE HYDROCHLORIDE SCH MG: 25 CAPSULE ORAL at 21:23

## 2019-02-15 RX ADMIN — GABAPENTIN SCH MG: 100 CAPSULE ORAL at 05:33

## 2019-02-15 RX ADMIN — LISINOPRIL SCH MG: 10 TABLET ORAL at 09:14

## 2019-02-15 RX ADMIN — TRIAMCINOLONE ACETONIDE SCH APP: 1 OINTMENT TOPICAL at 09:22

## 2019-02-15 RX ADMIN — GABAPENTIN SCH MG: 100 CAPSULE ORAL at 21:22

## 2019-02-15 RX ADMIN — ENOXAPARIN SODIUM SCH MG: 100 INJECTION SUBCUTANEOUS at 09:15

## 2019-02-15 RX ADMIN — LEVOTHYROXINE SODIUM SCH MCG: 75 TABLET ORAL at 05:33

## 2019-02-15 RX ADMIN — HYDROCODONE BITARTRATE AND ACETAMINOPHEN PRN TAB: 5; 325 TABLET ORAL at 11:50

## 2019-02-15 RX ADMIN — DOCUSATE SODIUM AND SENNOSIDES SCH TAB: 50; 8.6 TABLET ORAL at 21:23

## 2019-02-15 RX ADMIN — BISACODYL SCH MG: 10 SUPPOSITORY RECTAL at 05:34

## 2019-02-15 RX ADMIN — ASPIRIN 81 MG SCH MG: 81 TABLET ORAL at 09:14

## 2019-02-15 RX ADMIN — Medication SCH MG: at 09:13

## 2019-02-15 RX ADMIN — INSULIN HUMAN SCH UNITS: 100 INJECTION, SUSPENSION SUBCUTANEOUS at 21:23

## 2019-02-15 RX ADMIN — TRIAMCINOLONE ACETONIDE SCH APP: 1 OINTMENT TOPICAL at 20:25

## 2019-02-15 NOTE — SOAPPROG
SOAP Progress Note


Assessment/Plan: 


Assessment:





Spinal cord injury, T1 level, incomplete RYAN class C.  


* Initial functional independence measure was 52 on 2/4/2019, improved to 59 as 

of 2/11/2019.  Maximal assist for transfers, bed mobility.  Self-propelled 

wheelchair 90 ft with standby assist.  Standby assist for grooming and hygiene, 

upper body dressing and bathing.  Maximal assist for bath and toilet transfers.

  Total assist for toileting aftercare..  


* Continue PT and OT to optimize her mobility and activities of daily living. 





Complications of spinal cord injury with neurogenic bowel, neurogenic bladder.  

She will be treated with the neurogenic bowel and bladder protocols.  


* Liu catheter was removed 2/1/2019.  She is having intermittent 

catheterization per nursing.  She is unable to participate herself yet as she 

does not have those stability to be seated upright in order to visualize the 

perineum and use her arms and hands.  Discussed with patient 2/11/2019.  

Replaced Liu catheter until she develops sufficient upper body stability to 

learn how to self cath.





Neurogenic bowel


* Over-treatment with laxatives.  Received senna 7 tabs over 3 days prior to 

having 5 bowel movements in 1 day.


* Continue Senna 1 tab daily.


* Continue neurogenic bowel protocol with bisacodyl QD; timing changed to 0600 

beginning 2/15/2019.  Digital stimulation as needed.





Chronic conditions of dyslipidemia, hypothyroidism, and diabetes mellitus type 

2.  She will be continued on medications for these conditions.


* Initiate metformin 500 mg twice daily with meals beginning evening of 2/1/ 2019.  Increased to 1000 mg twice daily evening of 2/6/2019.


* Insulin glargine increased from 18 units to 20 units on 2/14/2019.





History of peripheral neuropathy.  She was treated with nortriptyline 10 mg at 

h.s.  As she also reports insomnia, increased to 25 mg at bedtime on 2/1/2019..

  


* Gabapentin at 100 mg three times daily was increased to 200 mg three times 

daily on 2/4/2019.  


* Has worsening lower extremity neuropathic symptoms.  Increased gabapentin to 

300 mg three times daily starting 2/12/2019, and to 300 mg 4 times a day on 2/14 /2019.  Per her request, will change to 400 mg 3 times a day starting 2/15/2019.





Fatigue, likely due to poor sleep.  Nursing has changed her to another mattress 

which she reports feels more comfortable, 2/6/2019. 


* Fatigue is much improved with improved sleep.





Left knee pain.  X-ray 2/6/2019 shows moderate to severe medial compartment 

osteoarthritis and a knee effusion.


* Improved with better positioning in bed initiated by Physical therapy.


* Continue acetaminophen p.r.n. and hydrocodone/acetaminophen p.r.n.





Fluid overload seen in the hospital.  


* Has had furosemide 40 mg q.day for 4 days.  Weight is unchanged.  

Discontinued furosemide starting 2/8/2019.


* Check weight Tuesday Thursday and Saturday.





Hypertension.  Intermittently above target, on amlodipine 5 mg q.day and 

furosemide 40 mg q.day.


* Discontinue furosemide 2/8/2019.


* Initiate lisinopril at 10 mg q.day starting 2/8/2019.  She reports she was 

using it previously and had no adverse effects.  Repeat BMP on 2/13/2019 with 

normal renal function.


* Had episode of lightheadedness 2/8/2019 but no hypotension was noted.  

Continue to monitor.





Hemorrhoids.  Initiated topical steroid preparation, 2/7/2019.





Peripheral vascular disease.  Continue aspirin. 





Skin lesions on the left toes, ischemic.  Seen by wound nurse.  They appear to 

be healing.  Continue painting with Betadine twice daily until eschar is 

resolved.





Hypomagnesemia.  Continue magnesium oxide.  Magnesium very slightly low on 2/1/ 2019.





Anemia in the hospital.  


* Also had significant iron deficiency, so initiated iron sulfate 325 mg q.day 

starting 2/1/2019.


* Stable on CBC 2/1/2019, with hemoglobin 8.3 and hematocrit 25.8.  Improving 

on CBC 2/13/2019.





Obesity and diabetes mellitus will merit a consult with the dietitian for 

optimal nutrition.  





Prophylaxis.  She has prolonged immobility and elevated risk for DVT.  Will 

initiate enoxaparin at 40 mg subcutaneous daily.  This can be discontinued if 

her mobility improves significantly or once she is 2-3 months out from the 

onset of her spinal cord injury.





DISPOSITION: Goal is to achieve one-person assist level for mobility and ADLs.  

Discharge date set for 2/22/2019.  Will not discharge home as she does not have 

enough help available.  May discharge to home of nearby relatives.








02/15/19 13:22





Subjective: 





Feels chilled this morning.  Says she feels chilled from time to time and this 

does not seem unusual to her.  She denies cough or dyspnea.  She has not felt 

feverish.  She denies urinary symptoms.  Otherwise without complaints.





Subsequently chills have resolved and she does not feel ill.





Requests gabapentin 400 mg 3 times a day rather than 300 mg 4 times a day.


Objective: 





 Vital Signs











Temp Pulse Resp BP Pulse Ox


 


 36.7 C   94   18   127/61 H  93 


 


 02/15/19 05:51  02/15/19 05:51  02/15/19 05:51  02/15/19 05:51  02/15/19 05:51








 Laboratory Results





 02/13/19 06:00 





 02/13/19 06:00 





 











 02/14/19 02/15/19 02/16/19





 05:59 05:59 05:59


 


Intake Total 900 1220 


 


Output Total 1350 1800 


 


Balance -450 -580 














Physical Exam





- Physical Exam


General Appearance: WD/WN, alert, no apparent distress


Respiratory: normal breath sounds, No crackles, No rhonchi, No wheezing


Cardiac/Chest: regular rate, rhythm, edema (1+ bilateral pretibial), No 

diastolic murmur, No systolic murmur


Skin: normal color, warm/dry


Neuro/Psych: alert, normal mood/affect, oriented x 3, motor weakness (Able to 

roll in bed independently.  Lower extremity weakness.)





ICD10 Worksheet


Patient Problems: 


 Problems











Problem Status Onset


 


Spinal cord stroke Acute

## 2019-02-16 RX ADMIN — ACETAMINOPHEN PRN MG: 325 TABLET ORAL at 23:14

## 2019-02-16 RX ADMIN — NORTRIPTYLINE HYDROCHLORIDE SCH MG: 25 CAPSULE ORAL at 20:37

## 2019-02-16 RX ADMIN — Medication SCH MG: at 08:08

## 2019-02-16 RX ADMIN — TRIAMCINOLONE ACETONIDE SCH APP: 1 OINTMENT TOPICAL at 09:34

## 2019-02-16 RX ADMIN — ENOXAPARIN SODIUM SCH MG: 100 INJECTION SUBCUTANEOUS at 08:21

## 2019-02-16 RX ADMIN — IRON SUPPLEMENT SCH MG: 325 TABLET ORAL at 08:10

## 2019-02-16 RX ADMIN — Medication SCH MG: at 17:20

## 2019-02-16 RX ADMIN — LEVOTHYROXINE SODIUM SCH MCG: 75 TABLET ORAL at 05:53

## 2019-02-16 RX ADMIN — GABAPENTIN SCH MG: 100 CAPSULE ORAL at 20:37

## 2019-02-16 RX ADMIN — MENTHOL SCH PATCH: 1 SPRAY TOPICAL at 08:07

## 2019-02-16 RX ADMIN — ATORVASTATIN CALCIUM SCH MG: 20 TABLET, FILM COATED ORAL at 20:37

## 2019-02-16 RX ADMIN — TRIAMCINOLONE ACETONIDE SCH APP: 1 OINTMENT TOPICAL at 20:39

## 2019-02-16 RX ADMIN — HYDROCODONE BITARTRATE AND ACETAMINOPHEN PRN TAB: 5; 325 TABLET ORAL at 20:37

## 2019-02-16 RX ADMIN — ACETAMINOPHEN PRN MG: 325 TABLET ORAL at 08:06

## 2019-02-16 RX ADMIN — ASPIRIN 81 MG SCH MG: 81 TABLET ORAL at 08:09

## 2019-02-16 RX ADMIN — LISINOPRIL SCH MG: 10 TABLET ORAL at 08:09

## 2019-02-16 RX ADMIN — INSULIN HUMAN SCH UNITS: 100 INJECTION, SUSPENSION SUBCUTANEOUS at 20:37

## 2019-02-16 RX ADMIN — DOCUSATE SODIUM AND SENNOSIDES SCH TAB: 50; 8.6 TABLET ORAL at 20:37

## 2019-02-16 RX ADMIN — GABAPENTIN SCH MG: 100 CAPSULE ORAL at 15:42

## 2019-02-16 RX ADMIN — ACETAMINOPHEN PRN MG: 325 TABLET ORAL at 15:41

## 2019-02-16 RX ADMIN — GABAPENTIN SCH MG: 100 CAPSULE ORAL at 08:08

## 2019-02-16 RX ADMIN — BISACODYL SCH MG: 10 SUPPOSITORY RECTAL at 05:53

## 2019-02-16 NOTE — SOAPPROG
SOAP Progress Note


Assessment/Plan: 





Spinal cord injury, T1 level, incomplete RYAN class C.  SHE HAS SOME ACTIVE 

RECRUITMENT OF THE RIGHT AND LEFT ANKLE DORSIFLEXORS AND PLANTAR FLEXORS, 

CURRENTLY NOT FUNCTIONAL.  NO ACTIVE RECRUITMENT OF QUADRICEPS, HAMSTRINGS OR 

HIP FLEXORS.  PATIENT ENCOURAGED TO PERFORM ACTIVE ANKLE DORSIFLEXION AND 

PLANTAR FLEXION DURING REST PERIODS AND BETWEEN THERAPY SESSIONS.


* Initial functional independence measure was 52 on 2/4/2019, improved to 59 as 

of 2/11/2019.  Maximal assist for transfers, bed mobility.  Self-propelled 

wheelchair 90 ft with standby assist.  Standby assist for grooming and hygiene, 

upper body dressing and bathing.  Maximal assist for bath and toilet transfers.

  Total assist for toileting aftercare..  


* Continue PT and OT to optimize her mobility and activities of daily living. 





Complications of spinal cord injury with neurogenic bowel, neurogenic bladder.  

She will be treated with the neurogenic bowel and bladder protocols.  


* Liu catheter was removed 2/1/2019.  She is having intermittent 

catheterization per nursing.  She is unable to participate herself yet as she 

does not have those stability to be seated upright in order to visualize the 

perineum and use her arms and hands.  Discussed with patient 2/11/2019.  

Replaced Liu catheter until she develops sufficient upper body stability to 

learn how to self cath.





Neurogenic bowel


* Over-treatment with laxatives.  Received senna 7 tabs over 3 days prior to 

having 5 bowel movements in 1 day.


* Continue Senna 1 tab daily.


* Continue neurogenic bowel protocol with bisacodyl QD; timing changed to 0600 

beginning 2/15/2019.  Digital stimulation as needed.





Chronic conditions of dyslipidemia, hypothyroidism, and diabetes mellitus type 

2.  She will be continued on medications for these conditions.


* Initiate metformin 500 mg twice daily with meals beginning evening of 2/1/ 2019.  Increased to 1000 mg twice daily evening of 2/6/2019.


* Insulin glargine increased from 18 units to 20 units on 2/14/2019.





History of peripheral neuropathy.  She was treated with nortriptyline 10 mg at 

h.s.  As she also reports insomnia, increased to 25 mg at bedtime on 2/1/2019..

  


* Gabapentin at 100 mg three times daily was increased to 200 mg three times 

daily on 2/4/2019.  


* Has worsening lower extremity neuropathic symptoms.  Increased gabapentin to 

300 mg three times daily starting 2/12/2019, and to 300 mg 4 times a day on 2/14 /2019.  Per her request, will change to 400 mg 3 times a day starting 2/15/

2019.  SHE DOES NOT FEEL THAT HER CURRENT PAIN LEVEL WARRANTS INCREASE IN 

GABAPENTIN DOSAGE AT THIS TIME.





Fatigue, likely due to poor sleep.  Nursing has changed her to another mattress 

which she reports feels more comfortable, 2/6/2019. 


* Fatigue is much improved with improved sleep.  SHE REPORTS INSOMNIA SECONDARY 

TO BEING AWAKENED Q 2 HR AT NIGHT FOR TURNING TO PREVENT PRESSURE SORES.  SHE 

CURRENTLY DENIES DAYTIME SLEEPINESS.





Left knee pain.  X-ray 2/6/2019 shows moderate to severe medial compartment 

osteoarthritis and a knee effusion.


* Improved with better positioning in bed initiated by Physical therapy.


* Continue acetaminophen p.r.n. and hydrocodone/acetaminophen p.r.n.





Fluid overload seen in the hospital.  


* Has had furosemide 40 mg q.day for 4 days.  Weight is unchanged.  

Discontinued furosemide starting 2/8/2019.


* Check weight Tuesday Thursday and Saturday.  WEIGHT FROM 02/16 A.M. IS PENDING





Hypertension.  Intermittently above target, on amlodipine 5 mg q.day and 

furosemide 40 mg q.day.  BLOOD PRESSURE FROM 02/16 PENDING


* Discontinue furosemide 2/8/2019.


* Initiate lisinopril at 10 mg q.day starting 2/8/2019.  She reports she was 

using it previously and had no adverse effects.  Repeat BMP on 2/13/2019 with 

normal renal function.


* Had episode of lightheadedness 2/8/2019 but no hypotension was noted.  

Continue to monitor.





Hemorrhoids.  Initiated topical steroid preparation, 2/7/2019.





Peripheral vascular disease.  Continue aspirin. 





Skin lesions on the left toes, ischemic.  Seen by wound nurse.  They appear to 

be healing.  Continue painting with Betadine twice daily until eschar is 

resolved.  CONTINUES TO HAVE SKIN SLOUGHING AROUND ISCHEMIC SITES.  THIS 

APPEARS TO BE STABLE WITHOUT INCREASED AREAS OF ISCHEMIA.





Hypomagnesemia.  Continue magnesium oxide.  Magnesium very slightly low on 2/1/ 2019.





Anemia in the hospital.  


* Also had significant iron deficiency, so initiated iron sulfate 325 mg q.day 

starting 2/1/2019.


* Stable on CBC 2/1/2019, with hemoglobin 8.3 and hematocrit 25.8.  Improving 

on CBC 2/13/2019.





Obesity and diabetes mellitus will merit a consult with the dietitian for 

optimal nutrition.  





Prophylaxis.  She has prolonged immobility and elevated risk for DVT.  Will 

initiate enoxaparin at 40 mg subcutaneous daily.  This can be discontinued if 

her mobility improves significantly or once she is 2-3 months out from the 

onset of her spinal cord injury.





DISPOSITION: Goal is to achieve one-person assist level for mobility and ADLs.  

Discharge date set for 2/22/2019.  Will not discharge home as she does not have 

enough help available.  May discharge to home of nearby relatives.




















02/16/19 09:50











Subjective: 





PATIENT REPORTS SHE HAS INTERMITTENT PAIN IN THE RIGHT LOWER EXTREMITY THAT 

BEGINS IN THE ANKLE AND SHOOTS UPWARDS.  SHE CURRENTLY DOES NOT FEEL THAT HER 

GABAPENTIN NEEDS TO BE INCREASED.


Objective: 





 Vital Signs











Temp Pulse Resp BP Pulse Ox


 


 36.8 C   102 H  16   123/63 H  94 


 


 02/15/19 20:00  02/15/19 20:00  02/15/19 20:00  02/15/19 20:00  02/15/19 20:00








 Laboratory Results





 02/13/19 06:00 





 02/13/19 06:00 





 











 02/15/19 02/16/19 02/17/19





 05:59 05:59 05:59


 


Intake Total 1220 1200 


 


Output Total 1800 1450 


 


Balance -580 -250 














Physical Exam





- Physical Exam


General Appearance: WD/WN, alert, no apparent distress, other (APPEARS 

COMFORTABLE LYING IN BED)


Respiratory: lungs clear, normal breath sounds


Cardiac/Chest: regular rate, rhythm, No edema


Abdomen: normal bowel sounds, non-tender, soft, other (NO SUPRAPUBIC TENDERNESS)


Skin: warm/dry, other (LEFT TOES CONTINUE TO HAVE SKIN SLOUGHING IN AREAS OF 

PREVIOUS ESCHARS.  THESE AREAS ARE DRESSED WITH BETADINE.  BOTH FEET ARE WARM,, 

NO ERYTHEMA, NO INDURATION.  GOOD CAPILLARY REFILL)


Extremities: No swelling, No Cheli's sign


Neuro/Psych: alert, normal mood/affect, oriented x 3, motor weakness (LOWER 

EXTREMITY MOTOR EXAM CONSISTENT WITH PARAPLEGIA.  1+2-/5 RIGHT AND LEFT 

DORSIFLEXION AND PLANTAR FLEXION.  NO ACTIVE RECRUITMENT OF THE QUADRICEPS OR 

HAMSTRINGS.  NO ACTIVE HIP FLEXION)





ICD10 Worksheet


Patient Problems: 


 Problems











Problem Status Onset


 


Spinal cord stroke Acute

## 2019-02-17 RX ADMIN — GABAPENTIN SCH MG: 100 CAPSULE ORAL at 15:43

## 2019-02-17 RX ADMIN — BISACODYL SCH MG: 10 SUPPOSITORY RECTAL at 05:21

## 2019-02-17 RX ADMIN — HYDROCORTISONE PRN APP: 25 CREAM TOPICAL at 16:59

## 2019-02-17 RX ADMIN — ACETAMINOPHEN PRN MG: 325 TABLET ORAL at 09:45

## 2019-02-17 RX ADMIN — TRIAMCINOLONE ACETONIDE SCH APP: 1 OINTMENT TOPICAL at 10:12

## 2019-02-17 RX ADMIN — ATORVASTATIN CALCIUM SCH MG: 20 TABLET, FILM COATED ORAL at 21:23

## 2019-02-17 RX ADMIN — ACETAMINOPHEN PRN MG: 325 TABLET ORAL at 22:21

## 2019-02-17 RX ADMIN — DOCUSATE SODIUM AND SENNOSIDES SCH TAB: 50; 8.6 TABLET ORAL at 21:23

## 2019-02-17 RX ADMIN — TRIAMCINOLONE ACETONIDE SCH APP: 1 OINTMENT TOPICAL at 21:22

## 2019-02-17 RX ADMIN — Medication SCH MG: at 17:22

## 2019-02-17 RX ADMIN — INSULIN HUMAN SCH UNITS: 100 INJECTION, SUSPENSION SUBCUTANEOUS at 21:23

## 2019-02-17 RX ADMIN — MENTHOL SCH PATCH: 1 SPRAY TOPICAL at 10:11

## 2019-02-17 RX ADMIN — Medication PRN APP: at 23:38

## 2019-02-17 RX ADMIN — HYDROCODONE BITARTRATE AND ACETAMINOPHEN PRN TAB: 5; 325 TABLET ORAL at 22:22

## 2019-02-17 RX ADMIN — GABAPENTIN SCH MG: 100 CAPSULE ORAL at 09:44

## 2019-02-17 RX ADMIN — NORTRIPTYLINE HYDROCHLORIDE SCH MG: 25 CAPSULE ORAL at 21:23

## 2019-02-17 RX ADMIN — LISINOPRIL SCH MG: 10 TABLET ORAL at 09:44

## 2019-02-17 RX ADMIN — IRON SUPPLEMENT SCH MG: 325 TABLET ORAL at 09:43

## 2019-02-17 RX ADMIN — ASPIRIN 81 MG SCH MG: 81 TABLET ORAL at 09:43

## 2019-02-17 RX ADMIN — Medication SCH MG: at 09:44

## 2019-02-17 RX ADMIN — HYDROCODONE BITARTRATE AND ACETAMINOPHEN PRN TAB: 5; 325 TABLET ORAL at 02:09

## 2019-02-17 RX ADMIN — GABAPENTIN SCH MG: 100 CAPSULE ORAL at 21:23

## 2019-02-17 RX ADMIN — Medication PRN APP: at 16:57

## 2019-02-17 RX ADMIN — HYDROCORTISONE PRN APP: 25 CREAM TOPICAL at 23:38

## 2019-02-17 RX ADMIN — LEVOTHYROXINE SODIUM SCH MCG: 75 TABLET ORAL at 05:21

## 2019-02-17 RX ADMIN — ENOXAPARIN SODIUM SCH MG: 100 INJECTION SUBCUTANEOUS at 09:50

## 2019-02-17 NOTE — SOAPPROG
SOAP Progress Note


Assessment/Plan: 





Spinal cord injury, T1 level, incomplete RYAN class C.  SHE HAS SOME ACTIVE 

RECRUITMENT OF THE RIGHT AND LEFT ANKLE DORSIFLEXORS AND PLANTAR FLEXORS, 

CURRENTLY NOT FUNCTIONAL.  NO ACTIVE RECRUITMENT OF QUADRICEPS, HAMSTRINGS OR 

HIP FLEXORS.  PATIENT ENCOURAGED TO PERFORM ACTIVE ANKLE DORSIFLEXION AND 

PLANTAR FLEXION DURING REST PERIODS AND BETWEEN THERAPY SESSIONS.


* Initial functional independence measure was 52 on 2/4/2019, improved to 59 as 

of 2/11/2019.  Maximal assist for transfers, bed mobility.  Self-propelled 

wheelchair 90 ft with standby assist.  Standby assist for grooming and hygiene, 

upper body dressing and bathing.  Maximal assist for bath and toilet transfers.

  Total assist for toileting aftercare..  


* Continue PT and OT to optimize her mobility and activities of daily living. 





Complications of spinal cord injury with neurogenic bowel, neurogenic bladder.  

She will be treated with the neurogenic bowel and bladder protocols.  


* Liu catheter was removed 2/1/2019.  She is having intermittent 

catheterization per nursing.  She is unable to participate herself yet as she 

does not have those stability to be seated upright in order to visualize the 

perineum and use her arms and hands.  Discussed with patient 2/11/2019.  

Replaced Liu catheter until she develops sufficient upper body stability to 

learn how to self cath.





Right ankle pain-will obtain x-rays today





Neurogenic bowel


* Over-treatment with laxatives.  Received senna 7 tabs over 3 days prior to 

having 5 bowel movements in 1 day.


* Continue Senna 1 tab daily.


* Continue neurogenic bowel protocol with bisacodyl QD; timing changed to 0600 

beginning 2/15/2019.  Digital stimulation as needed.





Chronic conditions of dyslipidemia, hypothyroidism, and diabetes mellitus type 

2.  She will be continued on medications for these conditions.


* Initiate metformin 500 mg twice daily with meals beginning evening of 2/1/ 2019.  Increased to 1000 mg twice daily evening of 2/6/2019.


* Insulin glargine increased from 18 units to 20 units on 2/14/2019.





History of peripheral neuropathy.  She was treated with nortriptyline 10 mg at 

h.s.  As she also reports insomnia, increased to 25 mg at bedtime on 2/1/2019..

  


* Gabapentin at 100 mg three times daily was increased to 200 mg three times 

daily on 2/4/2019.  


* Has worsening lower extremity neuropathic symptoms.  Increased gabapentin to 

300 mg three times daily starting 2/12/2019, and to 300 mg 4 times a day on 2/14 /2019.  Per her request, will change to 400 mg 3 times a day starting 2/15/

2019.  She was given the option of having her nighttime dose of gabapentin 

increased however she is hesitant to proceed with this at this time.  Will 

notify nursing that she changes her mind we can increase nighttime dose to 600. 





Fatigue, likely due to poor sleep.  Nursing has changed her to another mattress 

which she reports feels more comfortable, 2/6/2019. 


* Fatigue is much improved with improved sleep.  SHE REPORTS INSOMNIA SECONDARY 

TO BEING AWAKENED Q 2 HR AT NIGHT FOR TURNING TO PREVENT PRESSURE SORES.  SHE 

CURRENTLY DENIES DAYTIME SLEEPINESS.





Left knee pain.  X-ray 2/6/2019 shows moderate to severe medial compartment 

osteoarthritis and a knee effusion.


* Improved with better positioning in bed initiated by Physical therapy.


* Continue acetaminophen p.r.n. and hydrocodone/acetaminophen p.r.n.





Fluid overload seen in the hospital.  


* Has had furosemide 40 mg q.day for 4 days.  Weight is unchanged.  

Discontinued furosemide starting 2/8/2019.


* Check weight Tuesday Thursday and Saturday.  WEIGHT FROM 02/16 A.M. IS PENDING





Hypertension.  Intermittently above target, on amlodipine 5 mg q.day and 

furosemide 40 mg q.day.  BLOOD PRESSURE FROM 02/16 PENDING


* Discontinue furosemide 2/8/2019.


* Initiate lisinopril at 10 mg q.day starting 2/8/2019.  She reports she was 

using it previously and had no adverse effects.  Repeat BMP on 2/13/2019 with 

normal renal function.


* Had episode of lightheadedness 2/8/2019 but no hypotension was noted.  

Continue to monitor.





Hemorrhoids.  Initiated topical steroid preparation, 2/7/2019.





Peripheral vascular disease.  Continue aspirin. 





Skin lesions on the left toes, ischemic.  Seen by wound nurse.  They appear to 

be healing.  Continue painting with Betadine twice daily until eschar is 

resolved.  CONTINUES TO HAVE SKIN SLOUGHING AROUND ISCHEMIC SITES.  THIS 

APPEARS TO BE STABLE WITHOUT INCREASED AREAS OF ISCHEMIA.





Hypomagnesemia.  Continue magnesium oxide.  Magnesium very slightly low on 2/1/ 2019.





Anemia in the hospital.  


* Also had significant iron deficiency, so initiated iron sulfate 325 mg q.day 

starting 2/1/2019.


* Stable on CBC 2/1/2019, with hemoglobin 8.3 and hematocrit 25.8.  Improving 

on CBC 2/13/2019.





Obesity and diabetes mellitus will merit a consult with the dietitian for 

optimal nutrition.  





Prophylaxis.  She has prolonged immobility and elevated risk for DVT.  Will 

initiate enoxaparin at 40 mg subcutaneous daily.  This can be discontinued if 

her mobility improves significantly or once she is 2-3 months out from the 

onset of her spinal cord injury.





DISPOSITION: Goal is to achieve one-person assist level for mobility and ADLs.  

Discharge date set for 2/22/2019.  Will not discharge home as she does not have 

enough help available.  May discharge to home of nearby relatives.




















02/16/19 09:50











02/17/19 08:36





02/17/19 08:37





Subjective: 





She reports that she slept poorly secondary to severe right ankle pain not 

relieved with pain medications.


Objective: 





 Vital Signs











Temp Pulse Resp BP Pulse Ox


 


 36.6 C   95   16   107/46 L  92 


 


 02/17/19 06:35  02/17/19 06:35  02/17/19 06:35  02/17/19 06:35  02/17/19 06:35








 Laboratory Results





 02/13/19 06:00 





 02/13/19 06:00 





 











 02/16/19 02/17/19 02/18/19





 05:59 05:59 05:59


 


Intake Total 1200 850 


 


Output Total 1450 1500 


 


Balance -250 -650 














Physical Exam





- Physical Exam


General Appearance: WD/WN, alert, no apparent distress


Respiratory: lungs clear, normal breath sounds


Abdomen: normal bowel sounds, non-tender, soft, other (No suprapubic tenderness)


Skin: normal color, warm/dry, other (Skin sloughing left toes good 

vascularization)


Extremities: other (Right ankle with mild soft tissue swelling medial and 

lateral malleoli more pronounced medially.  Mild joint line tenderness.), No 

swelling, No Cheli's sign


Neuro/Psych: normal mood/affect, oriented x 3, motor weakness (Unchanged lower 

extremity motor exam.)





ICD10 Worksheet


Patient Problems: 


 Problems











Problem Status Onset


 


Spinal cord stroke Acute

## 2019-02-18 RX ADMIN — TRIAMCINOLONE ACETONIDE SCH APP: 1 OINTMENT TOPICAL at 21:00

## 2019-02-18 RX ADMIN — NORTRIPTYLINE HYDROCHLORIDE SCH MG: 25 CAPSULE ORAL at 20:56

## 2019-02-18 RX ADMIN — DOCUSATE SODIUM AND SENNOSIDES SCH TAB: 50; 8.6 TABLET ORAL at 20:56

## 2019-02-18 RX ADMIN — INSULIN HUMAN SCH UNITS: 100 INJECTION, SUSPENSION SUBCUTANEOUS at 21:01

## 2019-02-18 RX ADMIN — LEVOTHYROXINE SODIUM SCH MCG: 75 TABLET ORAL at 05:03

## 2019-02-18 RX ADMIN — HYDROCODONE BITARTRATE AND ACETAMINOPHEN PRN TAB: 5; 325 TABLET ORAL at 23:34

## 2019-02-18 RX ADMIN — Medication SCH MG: at 08:44

## 2019-02-18 RX ADMIN — BISACODYL SCH MG: 10 SUPPOSITORY RECTAL at 05:03

## 2019-02-18 RX ADMIN — TRIAMCINOLONE ACETONIDE SCH APP: 1 OINTMENT TOPICAL at 10:09

## 2019-02-18 RX ADMIN — ENOXAPARIN SODIUM SCH MG: 100 INJECTION SUBCUTANEOUS at 08:48

## 2019-02-18 RX ADMIN — MENTHOL SCH PATCH: 1 SPRAY TOPICAL at 08:48

## 2019-02-18 RX ADMIN — LISINOPRIL SCH MG: 10 TABLET ORAL at 08:45

## 2019-02-18 RX ADMIN — ATORVASTATIN CALCIUM SCH MG: 20 TABLET, FILM COATED ORAL at 20:56

## 2019-02-18 RX ADMIN — GABAPENTIN SCH MG: 100 CAPSULE ORAL at 08:43

## 2019-02-18 RX ADMIN — Medication SCH MG: at 17:19

## 2019-02-18 RX ADMIN — HYDROCODONE BITARTRATE AND ACETAMINOPHEN PRN TAB: 5; 325 TABLET ORAL at 17:19

## 2019-02-18 RX ADMIN — GABAPENTIN SCH MG: 100 CAPSULE ORAL at 20:56

## 2019-02-18 RX ADMIN — GABAPENTIN SCH MG: 100 CAPSULE ORAL at 15:47

## 2019-02-18 RX ADMIN — ASPIRIN 81 MG SCH MG: 81 TABLET ORAL at 08:44

## 2019-02-18 RX ADMIN — IRON SUPPLEMENT SCH MG: 325 TABLET ORAL at 08:44

## 2019-02-18 NOTE — SOAPPROG
SOAP Progress Note


Assessment/Plan: 





Spinal cord injury, T1 level, incomplete RYAN class C.  SHE HAS SOME ACTIVE 

RECRUITMENT OF THE RIGHT AND LEFT ANKLE DORSIFLEXORS AND PLANTAR FLEXORS, 

CURRENTLY NOT FUNCTIONAL.  This morning she was able to fire her right 

quadriceps PATIENT ENCOURAGED TO PERFORM ACTIVE ANKLE DORSIFLEXION AND PLANTAR 

FLEXION DURING REST PERIODS AND BETWEEN THERAPY SESSIONS.


* Initial functional independence measure was 52 on 2/4/2019, improved to 59 as 

of 2/11/2019.  Maximal assist for transfers, bed mobility.  Self-propelled 

wheelchair 90 ft with standby assist.  Standby assist for grooming and hygiene, 

upper body dressing and bathing.  Maximal assist for bath and toilet transfers.

  Total assist for toileting aftercare..  


* Continue PT and OT to optimize her mobility and activities of daily living. 





Complications of spinal cord injury with neurogenic bowel, neurogenic bladder.  

She will be treated with the neurogenic bowel and bladder protocols.  


* Liu catheter was removed 2/1/2019.  She is having intermittent 

catheterization per nursing.  She is unable to participate herself yet as she 

does not have those stability to be seated upright in order to visualize the 

perineum and use her arms and hands.  Discussed with patient 2/11/2019.  

Replaced Liu catheter until she develops sufficient upper body stability to 

learn how to self cath.  Reports that she had a hard bowel movement earlier 

this morning.  Discussed with nursing Deepak does not recommend stool softener 

due to previous over medication with bowel meds





Right ankle pain-will obtain right ankle x-ray today.  Right ankle swelling not 

increased compared to yesterday's exam.





Neurogenic bowel


* Over-treatment with laxatives.  Received senna 7 tabs over 3 days prior to 

having 5 bowel movements in 1 day.


* Continue Senna 1 tab daily.


* Continue neurogenic bowel protocol with bisacodyl QD; timing changed to 0600 

beginning 2/15/2019.  Digital stimulation as needed.





Chronic conditions of dyslipidemia, hypothyroidism, and diabetes mellitus type 

2.  She will be continued on medications for these conditions.


* Initiate metformin 500 mg twice daily with meals beginning evening of 2/1/ 2019.  Increased to 1000 mg twice daily evening of 2/6/2019.  Blood glucose 

from 2/17 was 147. 


* Insulin glargine increased from 18 units to 20 units on 2/14/2019.





History of peripheral neuropathy.  She was treated with nortriptyline 10 mg at 

h.s.  As she also reports insomnia, increased to 25 mg at bedtime on 2/1/2019..

  


* Gabapentin at 100 mg three times daily was increased to 200 mg three times 

daily on 2/4/2019.  SHE DOES NOT REPORT NEUROPATHIC PAIN ON TODAY'S EXAM.


* Has worsening lower extremity neuropathic symptoms.  Increased gabapentin to 

300 mg three times daily starting 2/12/2019, and to 300 mg 4 times a day on 2/14 /2019.  Per her request, will change to 400 mg 3 times a day starting 2/15/

2019.  She was given the option of having her nighttime dose of gabapentin 

increased however she is hesitant to proceed with this at this time.  Will 

notify nursing that she changes her mind we can increase nighttime dose to 600. 





Fatigue, likely due to poor sleep.  Nursing has changed her to another mattress 

which she reports feels more comfortable, 2/6/2019. 


* Fatigue is much improved with improved sleep.  SHE REPORTS INSOMNIA SECONDARY 

TO BEING AWAKENED Q 2 HR AT NIGHT FOR TURNING TO PREVENT PRESSURE SORES.  SHE 

CURRENTLY DENIES DAYTIME SLEEPINESS.





Left knee pain.  X-ray 2/6/2019 shows moderate to severe medial compartment 

osteoarthritis and a knee effusion.


* Improved with better positioning in bed initiated by Physical therapy.


* Continue acetaminophen p.r.n. and hydrocodone/acetaminophen p.r.n.





Fluid overload seen in the hospital.  NO OVERT SIGNS OF FLUID OVERLOAD ON 2/18 

EXAM


* Has had furosemide 40 mg q.day for 4 days.  Weight is unchanged.  

Discontinued furosemide starting 2/8/2019.


* Check weight Tuesday Thursday and Saturday.  WEIGHT FROM 2/17 78.7





Hypertension.  Intermittently above target, on amlodipine 5 mg q.day and 

furosemide 40 mg q.day.  BLOOD PRESSURE FROM 2/18 127/52


* Discontinue furosemide 2/8/2019.


* Initiate lisinopril at 10 mg q.day starting 2/8/2019.  She reports she was 

using it previously and had no adverse effects.  Repeat BMP on 2/13/2019 with 

normal renal function.


* Had episode of lightheadedness 2/8/2019 but no hypotension was noted.  

Continue to monitor.





Hemorrhoids.  Initiated topical steroid preparation, 2/7/2019.





Peripheral vascular disease.  Continue aspirin. 





Skin lesions on the left toes, ischemic.  Seen by wound nurse.  They appear to 

be healing.  Continue painting with Betadine twice daily until eschar is 

resolved.  CONTINUES TO HAVE SKIN SLOUGHING AROUND ISCHEMIC SITES.  TOES 

DRESSED WITH BETADINE.  LOWER EXTREMITY SHOWED GOOD VASCULARIZATION.  THIS 

APPEARS TO BE STABLE WITHOUT INCREASED AREAS OF ISCHEMIA.





Hypomagnesemia.  Continue magnesium oxide.  Magnesium very slightly low on 2/1/ 2019.





Anemia in the hospital.  


* Also had significant iron deficiency, so initiated iron sulfate 325 mg q.day 

starting 2/1/2019.


* Stable on CBC 2/1/2019, with hemoglobin 8.3 and hematocrit 25.8.  Improving 

on CBC 2/13/2019.





Obesity and diabetes mellitus will merit a consult with the dietitian for 

optimal nutrition.  





Prophylaxis.  She has prolonged immobility and elevated risk for DVT.  Will 

initiate enoxaparin at 40 mg subcutaneous daily.  This can be discontinued if 

her mobility improves significantly or once she is 2-3 months out from the 

onset of her spinal cord injury.





DISPOSITION: Goal is to achieve one-person assist level for mobility and ADLs.  

Discharge date set for 2/22/2019.  Will not discharge home as she does not have 

enough help available.  May discharge to home of nearby relatives.




















02/16/19 09:50











02/17/19 08:36





02/17/19 08:37





02/18/19 09:35





02/18/19 09:39





02/18/19 09:47





Subjective: 





No complaints this morning.  No problems reported by nursing staff


Objective: 





 Vital Signs











Temp Pulse Resp BP Pulse Ox


 


 36.4 C   86   12   127/52 H  92 


 


 02/18/19 07:40  02/18/19 07:40  02/18/19 07:40  02/18/19 08:45  02/18/19 07:40








 Laboratory Results





 02/13/19 06:00 





 02/13/19 06:00 





 











 02/17/19 02/18/19 02/19/19





 05:59 05:59 05:59


 


Intake Total 850 1200 


 


Output Total 1500 1575 


 


Balance -650 -375 














Physical Exam





- Physical Exam


General Appearance: WD/WN, alert, no apparent distress


Respiratory: lungs clear, normal breath sounds


Abdomen: normal bowel sounds, non-tender, soft, other (No suprapubic tenderness)

, No mass


Skin: other (Left toes dressed with Betadine.  Continue tissue sloughing.  Good 

vascularization both lower extremities.)


Neuro/Psych: motor weakness (Motor exam consistent with paraplegia however she 

does have active right and left dorsiflexion with some trace plantar flexion.  

She is firing the right quadriceps with 2+ 3-/5 strength)





ICD10 Worksheet


Patient Problems: 


 Problems











Problem Status Onset


 


Spinal cord stroke Acute

## 2019-02-19 RX ADMIN — ENOXAPARIN SODIUM SCH MG: 100 INJECTION SUBCUTANEOUS at 09:02

## 2019-02-19 RX ADMIN — ATORVASTATIN CALCIUM SCH MG: 20 TABLET, FILM COATED ORAL at 21:24

## 2019-02-19 RX ADMIN — GABAPENTIN SCH MG: 100 CAPSULE ORAL at 09:02

## 2019-02-19 RX ADMIN — GABAPENTIN SCH MG: 100 CAPSULE ORAL at 21:24

## 2019-02-19 RX ADMIN — LISINOPRIL SCH MG: 10 TABLET ORAL at 09:03

## 2019-02-19 RX ADMIN — NORTRIPTYLINE HYDROCHLORIDE SCH MG: 25 CAPSULE ORAL at 21:24

## 2019-02-19 RX ADMIN — GABAPENTIN SCH MG: 100 CAPSULE ORAL at 16:06

## 2019-02-19 RX ADMIN — Medication SCH MG: at 17:31

## 2019-02-19 RX ADMIN — MENTHOL SCH PATCH: 1 SPRAY TOPICAL at 09:03

## 2019-02-19 RX ADMIN — Medication SCH MG: at 07:45

## 2019-02-19 RX ADMIN — HYDROCODONE BITARTRATE AND ACETAMINOPHEN PRN TAB: 5; 325 TABLET ORAL at 16:10

## 2019-02-19 RX ADMIN — ACETAMINOPHEN PRN MG: 325 TABLET ORAL at 11:41

## 2019-02-19 RX ADMIN — ACETAMINOPHEN PRN MG: 325 TABLET ORAL at 21:05

## 2019-02-19 RX ADMIN — TRIAMCINOLONE ACETONIDE SCH APP: 1 OINTMENT TOPICAL at 09:05

## 2019-02-19 RX ADMIN — IRON SUPPLEMENT SCH MG: 325 TABLET ORAL at 09:03

## 2019-02-19 RX ADMIN — DOCUSATE SODIUM AND SENNOSIDES SCH TAB: 50; 8.6 TABLET ORAL at 21:24

## 2019-02-19 RX ADMIN — BISACODYL SCH MG: 10 SUPPOSITORY RECTAL at 06:27

## 2019-02-19 RX ADMIN — LEVOTHYROXINE SODIUM SCH MCG: 75 TABLET ORAL at 06:27

## 2019-02-19 RX ADMIN — ASPIRIN 81 MG SCH MG: 81 TABLET ORAL at 09:03

## 2019-02-19 RX ADMIN — HYDROCODONE BITARTRATE AND ACETAMINOPHEN PRN TAB: 5; 325 TABLET ORAL at 22:11

## 2019-02-19 RX ADMIN — INSULIN HUMAN SCH UNITS: 100 INJECTION, SUSPENSION SUBCUTANEOUS at 21:24

## 2019-02-19 NOTE — PDDCSUM
Discharge Summary


Discharge Summary: 





Name: Michaela Diaz





Admission date:  01/31/2019





Discharge date:  02/21/2019, anticipated





Discharging physician: Santana Michaels MD, Eric Holloway MD





Admitting diagnosis:  Ischemic spinal cord injury, incomplete





Discharge diagnosis:  Same





Comorbid diagnoses:  Neurogenic bowel, neurogenic bladder, neuropathic pain, 

dyslipidemia, hypothyroidism, diabetes type 2, fatigue, left knee pain, fluid 

overload status post diuresis, hypertension, hemorrhoids, peripheral vascular 

disease, skin lesions on toes, ischemic, hypomagnesemia, anemia, obesity.  

Multiple other in active medical issues per the history of present illness.





Consultations: physical therapy, occupational therapy, speech language 

pathology , social work, dietary, wound care





Procedures: 


X-ray of the left knee 02/06/2019 showed a small joint effusion with no acute 

osseous findings and degenerative changes including moderate to severe 

osteoarthritis in the medial compartment


X-ray ankle on the right, 02/17/2018 showed mild soft tissue swelling of the 

right ankle no evidence for acute osseous abnormality


KUB 02/14/2019 showed mild constipation.  Upper abdominal surgical clips were 

present.





Reason for admission:  Please see the full history and physical by Dr. Eric Holloway dated 01/31/2019 for full details, but briefly the patient was admitted 

from [facility] for a T1 RYAN C spinal cord injury from 

ischemic cord.  Patient infarcted the thoracic cord to the conus following an 

elective aortoiliac bypass graft which included reimplantation of 2 renal 

arteries, performed 12/07/2018 with acute bilateral lower extremity weakness 

afterwards.  Patient underwent low intensity therapy at a skilled nursing 

facility and was ultimately readmitted to [facility] with 

complication including a small sacral ulcer and neurogenic bowel.  She was 

disimpacted on 01/24/2019 and was ultimately discharged to inpatient 

rehabilitation for further rehab.





Rehabilitation course:  Goal for rehabilitation course was to decrease the 

burden of care and promote education and self guided care.  She made progress 

in rehabilitation and was self propelling a wheelchair with standby assistance.

  She was also standby assistance for grooming and hygiene as well as upper 

body dressing and bathing.  She still required considerable assistance for 

transfers and bed mobility as well as for bowel program.  She was started on a 

regular bowel program including suppository daily and senna and docusate.  She 

may have a combination of upper motor neuron and lower motor neuron bowel, she 

appeared to be somewhat responsive to suppositories, but should have a low 

threshold for manual disimpaction.  Diabetes was controlled with metformin 

which was started this hospitalization as well as insulin NPH at 20 units 

daily.  Peripheral neuropathy was treated with increased dose of nortriptyline 

at 25 mg at bedtime as well as gabapentin 400 mg 3 times a day with much 

improved symptoms.  Regarding her neurogenic skin, she had turned every 2 hr 

which should be continued on discharge and no development of pressure ulcers 

during rehabilitation.  She has multiple pain complaints including left knee 

pain and right ankle pain, diagnosed with moderate to severe osteoarthritis of 

the knee on x-ray, no osseous changes at the ankle however.  She was unable to 

tolerate splinting at the ankle plantar flexors due to neuropathic pain, also 

was unable to tolerate abdominal binders for occasional orthostatic hypotension 

due to fibromyalgia.  Blood pressure was controlled on amlodipine 5 mg daily 

and lisinopril 10 mg daily, furosemide was used to control fluid status and was 

ultimately discontinued on 02/08/2019.  Hemorrhoids were treated with steroid 

preparation.  Skin lesions of the left toes, ischemic were seen by the wound 

nurse and monitored.  Toes were dressed with Betadine daily.  For iron 

deficiency anemia she was started on iron sulfate this hospitalization as well.

  Magnesium was continued for previously diagnosed hypomagnesemia.  Enoxaparin 

subcutaneous should be continued for 12 weeks status post onset of his spinal 

cord injury.  She continues to have neurogenic bladder requiring Liu and this 

should be followed up with Urology.  She is potentially at risk of autonomic 

dysreflexia however this is likely a low risk.  Please see associated 

recommendations for management of autonomic dysreflexia.





Discharge plan:  Discharging to skilled nursing facility for long-term care.  

She will continue on a regular diet with thin liquids, carb controlled 2000 

calorie diet.  She should get turned every 2 hr, pressure release every 15 min 

when in a chair.  She requires a Burke for transfers.  She should have skin 

checks twice a day.  She should also continue with a Liu catheter for 

neurogenic bladder to be followed up with Urology.  Offloading heels should 

continue every evening as tolerated, paint eschar areas with Betadine twice a 

day on the left foot.





Medications at discharge: 


Acetaminophen 650 mg orally every 6 hr as needed for pain


Bisacodyl suppository 10 mg rectally every day at 6:00 a.m. Scheduled for 

neurogenic bowel


Dextrose 50% of I will 25 g IV push as needed for hypoglycemia


Ferrous sulfate 325 mg orally daily


Gabapentin 400 mg orally 3 times daily


Hydrocortisone 2.5% 1 application topically twice daily as needed for 

hemorrhoid pain


Insulin NPH 20 units subcutaneously at bedtime


Lidocaine 4%/menthol 1% 1 patch transdermally daily


Lisinopril 10 mg orally daily


Metformin 1000 mg orally twice a day with meals


Nortriptyline 25 mg orally at bedtime


Senna/docusate 1 tab orally at bedtime


Zinc oxide 1 application topical every 4 hr as needed for rash


Nystatin powder p.r.n. Topically daily as needed


Nitroglycerin 2% 0.5 in topical every 6 hr as needed for hypertension related 

to autonomic dysreflexia, please refer to autonomic dysreflexia recommendations


Magnesium oxide 400 mg orally twice daily with meals


Levothyroxine 75 mcg orally daily


Hydrocodone acetaminophen 5/325 1 tab orally every 6 hr as needed for pain


Enoxaparin 40 mg subcutaneous daily


Atorvastatin 20 mg orally at bedtime


Calcium carbonate 500 mg orally every 3 hr as needed for heartburn


Acetaminophen 650 mg orally every 6 hr as needed


Amlodipine 5 mg orally daily


Albuterol 3 mL inhalation every 2 hr as needed for shortness of breath


Aspirin 81 mg orally daily


Triamcinolone 0.1% ointment 1 application topical twice daily





Pending studies:  None





Issues to be addressed at follow-up:  Follow-up neurogenic bowel and bladder 

with physical medicine rehabilitation as well as a neurologist.  Follow-up 

surgical issue with her surgeon.





Follow up:  She should follow up with a Physical Medicine Rehabilitation 

provider, a Urology provider, as well as her surgeon.

## 2019-02-19 NOTE — SOAPPROG
SOAP Progress Note


Assessment/Plan: 


78-year-old woman status post spinal cord injury infarct after surgery 

involving her abdominal aorta and renal arteries, reported level is T1, 

incomplete RYAN C spinal cord injury with relatively preserved sensation 

compared to motor function.  Impairments in mobility and self-care.





Today's update:  Continue to make slow improvements from a rehabilitation 

standpoint, working through spinal cord education manual with good questions.  

Stopping tamsulosin as the bladder management plan going forward will be a 

Liu catheter with follow up with Urology.  Long-term care evaluation pending, 

goal to discharge to skilled nursing facility later this week.





A total of 35 min was spent on the floor in the care of the patient, the 

majority of which was spent counseling coordination of care regarding sequelae 

of spinal cord injury and prevention of complications, and discharge planning.





Additional issues reviewed without change include dyslipidemia, hypothyroidism, 

fatigue, left knee pain, fluid overload, hypertension, hemorrhoids, peripheral 

vascular disease, skin lesions on toes, hypomagnesemia, anemia in the hospital, 

obesity, and DVT prophylaxis.





02/14/19 13:29





02/19/19 10:38





Subjective: 





Chief complaint:  Questions about spinal cord stroke





No acute events overnight.  Patient denies any new shortness of breath or chest 

pain, no new numbness, tingling, weakness.  She continues to make progress in 

rehabilitation and feels okay about that.  She still has questions about spinal 

cord stroke, she has been working through the spinal cord education material 

available to her but she does not feel it is necessarily addressing her 

particular etiology of spinal cord injury.  Social work case that goal will be 

to discharge to skilled nursing facility later this week.  Patient appreciates 

education and working towards independence.  Counseled her about role of 

ambulation in overall recovery


Objective: 





 Vital Signs











Temp Pulse Resp BP Pulse Ox


 


 36.5 C   98   14   142/60 H  99 


 


 02/19/19 09:38  02/19/19 09:38  02/19/19 09:38  02/19/19 09:38  02/19/19 09:38








 Laboratory Results





 02/13/19 06:00 





 02/13/19 06:00 





 











 02/18/19 02/19/19 02/20/19





 05:59 05:59 05:59


 


Intake Total 1200 2225 200


 


Output Total 1575 1700 


 


Balance -375 525 200














Physical Exam





- Physical Exam


General Appearance: WD/WN, alert, no apparent distress


EENT: No scleral icterus (R), No scleral icterus (L)


Respiratory: No respiratory distress, No accessory muscle use


Cardiac/Chest: regular rate, rhythm, No edema


Abdomen: non-tender, soft, distended, No guarding


Skin: normal color, warm/dry, other (Left foot with painted iodine on her toes)

, No cyanosis, No diaphoresis


Extremities: No pedal edema, No swelling


Neuro/Psych: alert, normal mood/affect, motor weakness (1/5 in foot movement on 

the right, overall functional paraplegia), sensory deficit (Sensation mildly 

impaired, largely still intact however.)





ICD10 Worksheet


Patient Problems: 


 Problems











Problem Status Onset


 


Spinal cord stroke Acute

## 2019-02-19 NOTE — PDOREHIP
Admission [facility]





- Admission - 3 Day Assessment Period


Admission Date/Day 1: 01/31/19


Day 2: 02/01/19


Day 3: 02/02/19





- Active Diagnoses


Comorbidities and Co-existing Conditions at Admission: 89635. PVD or PAD





Discharge [facility]





- Discharge  - 3 Day Assessment Period


2 Days Prior to Anticipated Discharge Date: 02/20/19


1 Day Prior to Anticipated Discharge Date: 02/21/19


Anticipated Discharge Date: 02/22/19





- Discharge Skin Conditions


Unhealed Pressure Ulcer (1 or more/Stage 1 or >)-Discharge: 0. No


# Stage 1 Pressure Ulcers-Discharge: 0


# Stage 2 Pressure Ulcers-Discharge: 0


# of These Stage 2 Pressure Ulcers Present on Admission: 0


# Stage 3 Pressure Ulcers-Discharge: 0


# of These Stage 3 Pressure Ulcers Present on Admission: 0


# Stage 4 Pressure Ulcers-Discharge: 0


# of These Stage 4 Pressure Ulcers Present on Admission: 0


# Unstageable Pressure Ulcers (Non-remove Dress)-Discharge: 0


# These Unstageable Pressure Ulcers (NRD)-Present on Admit: 0


# Unstageable Pressure Ulcers (Slough/Eschar)-Discharge: 0


# These Unstageable Pressure Ulcers(Slough) Present on Admit: 0


# Unstageable Pressure Ulcers (Deep Tissue Injury)-Discharge: 0


# These Unstageable Pressure Ulcers (DTI) Present on Admit: 0

## 2019-02-20 RX ADMIN — ASPIRIN 81 MG SCH MG: 81 TABLET ORAL at 09:02

## 2019-02-20 RX ADMIN — BISACODYL SCH MG: 10 SUPPOSITORY RECTAL at 05:20

## 2019-02-20 RX ADMIN — GABAPENTIN SCH MG: 100 CAPSULE ORAL at 09:01

## 2019-02-20 RX ADMIN — LEVOTHYROXINE SODIUM SCH MCG: 75 TABLET ORAL at 05:20

## 2019-02-20 RX ADMIN — NORTRIPTYLINE HYDROCHLORIDE SCH MG: 25 CAPSULE ORAL at 21:06

## 2019-02-20 RX ADMIN — DOCUSATE SODIUM AND SENNOSIDES SCH TAB: 50; 8.6 TABLET ORAL at 21:06

## 2019-02-20 RX ADMIN — IRON SUPPLEMENT SCH MG: 325 TABLET ORAL at 09:02

## 2019-02-20 RX ADMIN — TRIAMCINOLONE ACETONIDE SCH: 1 OINTMENT TOPICAL at 00:41

## 2019-02-20 RX ADMIN — TRIAMCINOLONE ACETONIDE SCH: 1 OINTMENT TOPICAL at 22:09

## 2019-02-20 RX ADMIN — ACETAMINOPHEN PRN MG: 325 TABLET ORAL at 18:40

## 2019-02-20 RX ADMIN — ATORVASTATIN CALCIUM SCH MG: 20 TABLET, FILM COATED ORAL at 21:05

## 2019-02-20 RX ADMIN — Medication SCH MG: at 17:27

## 2019-02-20 RX ADMIN — MENTHOL SCH PATCH: 1 SPRAY TOPICAL at 08:59

## 2019-02-20 RX ADMIN — GABAPENTIN SCH MG: 100 CAPSULE ORAL at 21:06

## 2019-02-20 RX ADMIN — HYDROCODONE BITARTRATE AND ACETAMINOPHEN PRN TAB: 5; 325 TABLET ORAL at 18:41

## 2019-02-20 RX ADMIN — HYDROCODONE BITARTRATE AND ACETAMINOPHEN PRN TAB: 5; 325 TABLET ORAL at 11:45

## 2019-02-20 RX ADMIN — TRIAMCINOLONE ACETONIDE SCH APP: 1 OINTMENT TOPICAL at 09:04

## 2019-02-20 RX ADMIN — ENOXAPARIN SODIUM SCH MG: 100 INJECTION SUBCUTANEOUS at 09:01

## 2019-02-20 RX ADMIN — GABAPENTIN SCH MG: 100 CAPSULE ORAL at 16:13

## 2019-02-20 RX ADMIN — ACETAMINOPHEN PRN MG: 325 TABLET ORAL at 11:45

## 2019-02-20 RX ADMIN — LISINOPRIL SCH MG: 10 TABLET ORAL at 09:02

## 2019-02-20 RX ADMIN — Medication SCH MG: at 09:02

## 2019-02-20 RX ADMIN — HYDROCORTISONE PRN APP: 25 CREAM TOPICAL at 09:04

## 2019-02-20 RX ADMIN — INSULIN HUMAN SCH UNITS: 100 INJECTION, SUSPENSION SUBCUTANEOUS at 21:07

## 2019-02-20 NOTE — SOAPPROG
SOAP Progress Note


Assessment/Plan: 





Spinal cord injury, T1 level, incomplete RYAN class C.  SHE HAS SOME ACTIVE 

RECRUITMENT OF THE RIGHT AND LEFT ANKLE DORSIFLEXORS AND PLANTAR FLEXORS, 

CURRENTLY NOT FUNCTIONAL.  This is unchanged from serial exams.  This morning 

she was able to fire her right quadriceps PATIENT ENCOURAGED TO PERFORM ACTIVE 

ANKLE DORSIFLEXION AND PLANTAR FLEXION DURING REST PERIODS AND BETWEEN THERAPY 

SESSIONS.


* Initial functional independence measure was 52 on 2/4/2019, improved to 59 as 

of 2/11/2019.  Maximal assist for transfers, bed mobility.  Self-propelled 

wheelchair 90 ft with standby assist.  Standby assist for grooming and hygiene, 

upper body dressing and bathing.  Maximal assist for bath and toilet transfers.

  Total assist for toileting aftercare..  


* Continue PT and OT to optimize her mobility and activities of daily living. 








Neurogenic bowel-current bowel program includes Dulcolax and Senokot-S.  No 

current modifications to bowel program.





Neurogenic bladder-patient has Liu catheter in place.  Plan is to discharge 

to skilled nursing facility with Liu in place.





Chronic conditions of dyslipidemia, hypothyroidism, and diabetes mellitus type 

2.  She will be continued on medications for these conditions.


* Initiate metformin 500 mg twice daily with meals beginning evening of 2/1/ 2019.  Increased to 1000 mg twice daily evening of 2/6/2019.  Blood glucose 

from 2/17 was 147. 


* Insulin glargine increased from 18 units to 20 units on 2/14/2019.








Insulin dependent diabetes.  Blood glucose from 02/19 184. Continue to monitor 

blood sugars daily.





History of peripheral neuropathy.   STABLE ON CURRENT DOSE OF GABAPENTIN 400 MG 

THREE TIMES DAILY.  She was treated with nortriptyline 10 mg at h.s.  As she 

also reports insomnia, increased to 25 mg at bedtime on 2/1/2019..  


* Gabapentin at 100 mg three times daily was increased to 200 mg three times 

daily on 2/4/2019.  SHE DOES NOT REPORT NEUROPATHIC PAIN ON TODAY'S EXAM.


* Has worsening lower extremity neuropathic symptoms.  Increased gabapentin to 

300 mg three times daily starting 2/12/2019, and to 300 mg 4 times a day on 2/14 /2019.  Per her request, will change to 400 mg 3 times a day starting 2/15/

2019.  She was given the option of having her nighttime dose of gabapentin 

increased however she is hesitant to proceed with this at this time.  Will 

notify nursing that she changes her mind we can increase nighttime dose to 600. 





Fatigue, likely due to poor sleep.  Nursing has changed her to another mattress 

which she reports feels more comfortable, 2/6/2019. 


* Fatigue is much improved with improved sleep.  SHE REPORTS INSOMNIA SECONDARY 

TO BEING AWAKENED Q 2 HR AT NIGHT FOR TURNING TO PREVENT PRESSURE SORES.  SHE 

CURRENTLY DENIES DAYTIME SLEEPINESS.





Left knee pain.  AS OF 2/20, NO COMPLAINTS OF LEFT KNEE PAIN.  X-ray 2/6/2019 

shows moderate to severe medial compartment osteoarthritis and a knee effusion.


* Improved with better positioning in bed initiated by Physical therapy.


* Continue acetaminophen p.r.n. and hydrocodone/acetaminophen p.r.n.





RIGHT SHOULDER PAIN-FAIRLY NEW ONSET WHICH BEGAN 2/19.  PATIENT ATTRIBUTES THIS 

TO WHEELCHAIR USE.  OCCUPATIONAL THERAPY HAS BEEN TAPING.  ROTATOR CUFF 

STRENGTHENING EXERCISES.  PENDULUM EXERCISES.





Fluid overload seen in the hospital.  NO OVERT SIGNS OF FLUID OVERLOAD ON 2/20 

EXAM


* Has had furosemide 40 mg q.day for 4 days.  Weight is unchanged.  

Discontinued furosemide starting 2/8/2019.


* Check weight Tuesday Thursday and Saturday.  WEIGHT FROM 2/17 78.7





Hypertension.  BLOOD PRESSURE FROM 2/20 113/57 Intermittently above target, on 

amlodipine 5 mg q.day and furosemide 40 mg q.day. 


* Discontinue furosemide 2/8/2019.


* Initiate lisinopril at 10 mg q.day starting 2/8/2019.  She reports she was 

using it previously and had no adverse effects.  Repeat BMP on 2/13/2019 with 

normal renal function.


* Had episode of lightheadedness 2/8/2019 but no hypotension was noted.  

Continue to monitor.





Hemorrhoids.  Initiated topical steroid preparation, 2/7/2019.





Peripheral vascular disease.  Continue aspirin. 





Skin lesions on the left toes, ischemic.  Seen by wound nurse.  They appear to 

be healing.  Continue painting with Betadine twice daily until eschar is 

resolved.  CONTINUES TO HAVE SKIN SLOUGHING AROUND ISCHEMIC SITES.  TOES 

DRESSED WITH BETADINE.  LOWER EXTREMITY SHOWED GOOD VASCULARIZATION.  THIS 

APPEARS TO BE STABLE WITHOUT INCREASED AREAS OF ISCHEMIA.





Hypomagnesemia.  Continue magnesium oxide.  Magnesium very slightly low on 2/1/ 2019.





Anemia in the hospital.  


* Also had significant iron deficiency, so initiated iron sulfate 325 mg q.day 

starting 2/1/2019.


* Stable on CBC 2/1/2019, with hemoglobin 8.3 and hematocrit 25.8.  Improving 

on CBC 2/13/2019.





Obesity and diabetes mellitus will merit a consult with the dietitian for 

optimal nutrition.  





Prophylaxis.  She has prolonged immobility and elevated risk for DVT.  Will 

initiate enoxaparin at 40 mg subcutaneous daily.  This can be discontinued if 

her mobility improves significantly or once she is 2-3 months out from the 

onset of her spinal cord injury.





DISPOSITION: Goal is to achieve one-person assist level for mobility and ADLs.  

Discharge date set for 2/22/2019.  Will not discharge home as she does not have 

enough help available.  May discharge to home of nearby relatives.











02/20/19 09:30











Subjective: 


No complaints this morning.  She reports decreased right ankle pain this 

morning.  She does report fairly new onset of right shoulder pain which she 

attributes to wheelchair use.  Occupational therapy has been taping the right 

shoulder with some relief.


Objective: 





 Vital Signs











Temp Pulse Resp BP Pulse Ox


 


 36.9 C   91   14   113/57 L  95 


 


 02/20/19 08:00  02/20/19 08:00  02/20/19 08:00  02/20/19 08:00  02/20/19 08:00








 Laboratory Results





 02/13/19 06:00 





 02/13/19 06:00 





 











 02/19/19 02/20/19 02/21/19





 05:59 05:59 05:59


 


Intake Total 2225 980 


 


Output Total 1700 750 


 


Balance 525 230 














Physical Exam





- Physical Exam


General Appearance: WD/WN, alert, no apparent distress


Respiratory: lungs clear, normal breath sounds


Abdomen: normal bowel sounds, non-tender, other (No suprapubic tenderness.)


Skin: other (Left toe ischemic necrosis, toes dressed with Betadine.  Continues 

with tissue sloughing.  Good capillary refill.)


Extremities: No swelling, No Cheli's sign


Neuro/Psych: motor weakness (No change in lower extremity motor exam when 

compared to previous serial motor exams.  She has 1+ 2-/5 tibialis anterior, 

with greater strength on the right.  Trace right and left ankle plantar 

flexion.  Trace right quadriceps.)





ICD10 Worksheet


Patient Problems: 


 Problems











Problem Status Onset


 


Spinal cord stroke Acute

## 2019-02-21 RX ADMIN — IRON SUPPLEMENT SCH MG: 325 TABLET ORAL at 08:14

## 2019-02-21 RX ADMIN — TRIAMCINOLONE ACETONIDE SCH APP: 1 OINTMENT TOPICAL at 09:52

## 2019-02-21 RX ADMIN — ATORVASTATIN CALCIUM SCH MG: 20 TABLET, FILM COATED ORAL at 20:22

## 2019-02-21 RX ADMIN — LEVOTHYROXINE SODIUM SCH MCG: 75 TABLET ORAL at 06:39

## 2019-02-21 RX ADMIN — ENOXAPARIN SODIUM SCH MG: 100 INJECTION SUBCUTANEOUS at 08:15

## 2019-02-21 RX ADMIN — TRIAMCINOLONE ACETONIDE SCH: 1 OINTMENT TOPICAL at 20:23

## 2019-02-21 RX ADMIN — DOCUSATE SODIUM AND SENNOSIDES SCH TAB: 50; 8.6 TABLET ORAL at 20:23

## 2019-02-21 RX ADMIN — ASPIRIN 81 MG SCH MG: 81 TABLET ORAL at 08:15

## 2019-02-21 RX ADMIN — ACETAMINOPHEN PRN MG: 325 TABLET ORAL at 20:23

## 2019-02-21 RX ADMIN — Medication SCH MG: at 18:11

## 2019-02-21 RX ADMIN — BISACODYL SCH MG: 10 SUPPOSITORY RECTAL at 06:39

## 2019-02-21 RX ADMIN — GABAPENTIN SCH MG: 100 CAPSULE ORAL at 08:15

## 2019-02-21 RX ADMIN — GABAPENTIN SCH MG: 100 CAPSULE ORAL at 14:43

## 2019-02-21 RX ADMIN — GABAPENTIN SCH MG: 100 CAPSULE ORAL at 20:22

## 2019-02-21 RX ADMIN — HYDROCODONE BITARTRATE AND ACETAMINOPHEN PRN TAB: 5; 325 TABLET ORAL at 20:23

## 2019-02-21 RX ADMIN — NORTRIPTYLINE HYDROCHLORIDE SCH MG: 25 CAPSULE ORAL at 20:23

## 2019-02-21 RX ADMIN — LISINOPRIL SCH MG: 10 TABLET ORAL at 08:14

## 2019-02-21 RX ADMIN — MENTHOL SCH: 1 SPRAY TOPICAL at 08:16

## 2019-02-21 RX ADMIN — INSULIN HUMAN SCH UNITS: 100 INJECTION, SUSPENSION SUBCUTANEOUS at 21:04

## 2019-02-21 RX ADMIN — Medication SCH MG: at 08:14

## 2019-02-21 NOTE — SOAPPROG
SOAP Progress Note


Assessment/Plan: 


Assessment:





Spinal cord injury, T1 level, incomplete RYAN class C.  


* Initial functional independence measure was 52 on 2/4/2019, improved to 59 as 

of 2/11/2019, and to 62 as of 2/18/2019..  Maximal assist for transfers, bed 

mobility.  Self-propelled wheelchair 90 ft with standby assist.  Standby assist 

for grooming and hygiene, upper body dressing and bathing.  Maximal assist for 

bath and toilet transfers.  Total assist for toileting aftercare..  


* Continue PT and OT to optimize her mobility and activities of daily living. 





Complications of spinal cord injury with neurogenic bowel, neurogenic bladder.  

She will be treated with the neurogenic bowel and bladder protocols.  


* Liu catheter was removed 2/1/2019.  She is having intermittent 

catheterization per nursing.  She is unable to participate herself yet as she 

does not have those stability to be seated upright in order to visualize the 

perineum and use her arms and hands.  Discussed with patient 2/11/2019.  

Replaced Liu catheter until she develops sufficient upper body stability to 

learn how to self cath.





Neurogenic bowel


* Continue Senna 1 tab daily.


* Continue neurogenic bowel protocol with bisacodyl QD; timing changed to 0600 

beginning 2/15/2019.  Digital stimulation as needed.





Chronic conditions of dyslipidemia, hypothyroidism, and diabetes mellitus type 

2.  She will be continued on medications for these conditions.


* Initiate metformin 500 mg twice daily with meals beginning evening of 2/1/ 2019.  Increased to 1000 mg twice daily evening of 2/6/2019.


* Insulin glargine increased from 18 units to 20 units on 2/14/2019.





History of peripheral neuropathy.  She was treated with nortriptyline 10 mg at 

h.s.  As she also reports insomnia, increased to 25 mg at bedtime on 2/1/2019..

  


* Gabapentin at 100 mg three times daily was increased to 200 mg three times 

daily on 2/4/2019.  


* Has worsening lower extremity neuropathic symptoms.  Increased gabapentin to 

300 mg three times daily starting 2/12/2019, and to 300 mg 4 times a day on 2/14 /2019.  Per her request, will change to 400 mg 3 times a day starting 2/15/2019.





Fatigue, likely due to poor sleep.  Nursing has changed her to another mattress 

which she reports feels more comfortable, 2/6/2019. 


* Fatigue is much improved with improved sleep.





Left knee pain.  X-ray 2/6/2019 shows moderate to severe medial compartment 

osteoarthritis and a knee effusion.


* Improved with better positioning in bed initiated by Physical therapy.


* Continue acetaminophen p.r.n. and hydrocodone/acetaminophen p.r.n.





Fluid overload seen in the hospital.  


* Has had furosemide 40 mg q.day for 4 days.  Weight is unchanged.  

Discontinued furosemide starting 2/8/2019.


* Check weight Tuesday Thursday and Saturday.





Hypertension.  Intermittently above target, on amlodipine 5 mg q.day and 

furosemide 40 mg q.day.


* Discontinue furosemide 2/8/2019.


* Initiate lisinopril at 10 mg q.day starting 2/8/2019.  She reports she was 

using it previously and had no adverse effects.  Repeat BMP on 2/13/2019 with 

normal renal function.


* Had episode of lightheadedness 2/8/2019 but no hypotension was noted.  

Continue to monitor.





Hemorrhoids.  Initiated topical steroid preparation, 2/7/2019.





Peripheral vascular disease.  Continue aspirin. 





Skin lesions on the left toes, ischemic.  Seen by wound nurse.  They appear to 

be healing.  Continue painting with Betadine twice daily until eschar is 

resolved.





Hypomagnesemia.  Continue magnesium oxide.  Magnesium very slightly low on 2/1/ 2019.





Anemia in the hospital.  


* Also had significant iron deficiency, so initiated iron sulfate 325 mg q.day 

starting 2/1/2019.


* Stable on CBC 2/1/2019, with hemoglobin 8.3 and hematocrit 25.8.  Improving 

on CBC 2/13/2019.





Obesity and diabetes mellitus will merit a consult with the dietitian for 

optimal nutrition.  





Prophylaxis.  She has prolonged immobility and elevated risk for DVT.  Will 

initiate enoxaparin at 40 mg subcutaneous daily.  This can be discontinued if 

her mobility improves significantly or once she is 2-3 months out from the 

onset of her spinal cord injury.





DISPOSITION: Goal is to achieve one-person assist level for mobility and ADLs.  

Discharge date set for 2/22/2019.  Discharge to skilled nursing facility as 

burden of care is too high for her to discharge to home of relatives.





02/21/19 15:07





Subjective: 





Ready to discharge tomorrow.  She feels she has learned a lot.  She says she is 

much more able to dress herself and to participate and direct her own cares.  

Had 3 bowel movements today.


Objective: 





 Vital Signs











Temp Pulse Resp BP Pulse Ox


 


 36.9 C   93   16   115/85 H  95 


 


 02/21/19 07:10  02/21/19 07:10  02/21/19 07:10  02/21/19 08:14  02/21/19 07:10








 Laboratory Results





 02/13/19 06:00 





 02/13/19 06:00 





 











 02/20/19 02/21/19 02/22/19





 05:59 05:59 05:59


 


Intake Total 980 1550 960


 


Output Total 750 1350 450


 


Balance 230 200 510














Physical Exam





- Physical Exam


General Appearance: WD/WN, alert, no apparent distress


Respiratory: No respiratory distress, No accessory muscle use


Cardiac/Chest: edema (1+ bilateral pretibial)


Skin: normal color, warm/dry


Neuro/Psych: alert, normal mood/affect, oriented x 3, motor weakness (Bilateral 

lower extremity)





ICD10 Worksheet


Patient Problems: 


 Problems











Problem Status Onset


 


Spinal cord stroke Acute

## 2019-02-22 VITALS — SYSTOLIC BLOOD PRESSURE: 129 MMHG | DIASTOLIC BLOOD PRESSURE: 68 MMHG

## 2019-02-22 RX ADMIN — LEVOTHYROXINE SODIUM SCH MCG: 75 TABLET ORAL at 05:11

## 2019-02-22 RX ADMIN — IRON SUPPLEMENT SCH MG: 325 TABLET ORAL at 08:25

## 2019-02-22 RX ADMIN — ASPIRIN 81 MG SCH MG: 81 TABLET ORAL at 08:25

## 2019-02-22 RX ADMIN — ENOXAPARIN SODIUM SCH MG: 100 INJECTION SUBCUTANEOUS at 08:25

## 2019-02-22 RX ADMIN — BISACODYL SCH MG: 10 SUPPOSITORY RECTAL at 06:32

## 2019-02-22 RX ADMIN — LISINOPRIL SCH MG: 10 TABLET ORAL at 08:25

## 2019-02-22 RX ADMIN — Medication SCH MG: at 08:25

## 2019-02-22 RX ADMIN — HYDROCODONE BITARTRATE AND ACETAMINOPHEN PRN TAB: 5; 325 TABLET ORAL at 05:11

## 2019-02-22 RX ADMIN — TRIAMCINOLONE ACETONIDE SCH APP: 1 OINTMENT TOPICAL at 08:38

## 2019-02-22 RX ADMIN — GABAPENTIN SCH MG: 100 CAPSULE ORAL at 08:24

## 2019-02-22 RX ADMIN — MENTHOL SCH PATCH: 1 SPRAY TOPICAL at 08:26

## 2019-02-22 NOTE — GDS
[f 
rep st]



                                                             DISCHARGE SUMMARY





ADMITTING DIAGNOSIS:  Spinal cord injury, incomplete RYAN class C, as 
complication of aortic reconstruction surgery.



DISCHARGE DIAGNOSIS:  Spinal cord injury, incomplete RYAN class C, as 
complication of aortic reconstruction surgery.



OTHER DISCHARGE DIAGNOSES:  

1.  Neurogenic bowel and neurogenic bladder.

2.  Dyslipidemia, hypothyroidism and diabetes mellitus type 2.

3.  Hypertension.

4.  Ischemic skin lesions on the left foot.  

5.  Obesity.



COMPLICATIONS:  There were none.



PROCEDURES:  There were none.



CONSULTATIONS:  She was seen by the wound care nurse, Oneyda Avila.



HISTORY AND HOSPITAL COURSE:  This patient was admitted from [facility].  She underwent an elective aortoiliac bypass graft.  It was 
a complicated procedure in which she needed reimplantation of both renal 
arteries.  In the course of the procedure she suffered thoracic to lumbar 
spinal cord ischemia.  The surgery was done at [facility].
  She was subsequently discharged to a skilled nursing facility where she felt 
very unsatisfied with her care so she returned home; however, her family was 
unable to care for her there and she was returned to the hospital where she was 
found to have severe constipation and electrolyte abnormalities.  She also 
required a Liu catheter.  She was eventually medically stabilized and 
discharged to inpatient rehabilitation. 



She was stable in inpatient rehabilitation but failed to have sufficient 
progress for discharge to home.  Her initial functional independence measure 
was 52 on 02/04/2019, which is consistent with nursing home level of care 
requiring assistance for all activities of daily living except for eating and 
for all mobility.  Her functional independence measure improved to 62 as of 02/
18/2019.  At that time, she continued to require maximal assistance for 
transfers and bed mobility.  She was able to self propel a wheelchair for 90 
feet with standby assist.  She required standby assist for grooming and hygiene
, upper body dressing and bathing, and maximal assist for bath and toilet 
transfers.  She required total assist for toileting aftercare. 



Regarding neurogenic bowel and neurogenic bladder, she received intermittent 
bladder catheterization with the hope that she would be able to self-catheterize
, but ultimately she was unable to develop sufficient core strength and trunk 
stability to have her hands free.  Additionally, her obesity was a barrier to 
self catheterization, thus Liu catheter was replaced on 02/11/2019.  
Regarding neurogenic bowel, she received senna 1 tab daily and she had a 
bisacodyl suppository at 6 a.m. each day with digital stimulation as needed.  
She did not have completely regular bowel movements so neurogenic bowel is an 
issue that might continue to receive attention. 



She had a history of peripheral neuropathy and she also had neuropathic pain 
likely related to her spinal cord injury.  She was initially taking 
nortriptyline 10 mg at bedtime.  This was increased to 25 mg at bedtime on 02/01
/2019, and she was able to sleep better.  Gabapentin was initiated at 100 mg 
three times daily and increased to 400 mg three times daily starting 02/15/
2019.  She had relief of her neuropathic symptoms. 



Regarding diabetes mellitus type 2, she was initiated on metformin 500 mg twice 
a day.  This was increased to 1000 mg twice a day on 02/06/2019.  She had an 
increase in insulin glargine from 18 units to 20 units at bedtime on 02/14/
2019.  In the last several days before discharge, her fasting blood sugars were 
as low as 89 and otherwise blood sugars ranged from 153-231 during the day. 



Regarding hypertension, blood pressure was stabilized with lisinopril and 
hydrochlorothiazide. 



Regarding ischemic skin lesions on the left toes, she was seen by the wound 
nurse.  These were not pressure ulcers.  She was treated with Betadine twice 
daily for eschar and skin was healing quite nicely.



DISCHARGE PLAN:  



DISPOSITION:  To a skilled nursing facility in the [facility], the [facility], for long-term care under Medicaid.



CONDITION:  Good.



ACTIVITY:  She requires assistance for all mobility related ADLs as well as for 
other ADLs as described above.



DIET:  No concentrated carbohydrate 2000 kilocalories per day.



ALLERGIES:  She has allergies to doxycycline, duloxetine, morphine, and 
topiramate.



DISCHARGE MEDICATIONS:  

1.  Acetaminophen 650 mg p.o. q.6 hours p.r.n.

2.  Albuterol nebulizer 3 mL q.2 hours p.r.n.

3.  Amlodipine 5 mg p.o. daily.

4.  Aspirin 81 mg daily.

5.  Atorvastatin 20 mg p.o. at bedtime.

6.  Bisacodyl suppository 1 p.r. daily at 6 a.m. as part of the neurogenic 
bowel protocol.

7.  Calcium carbonate 500 mg p.o. q.3 hours p.r.n.

8.  Enoxaparin 40 mg subcutaneous daily, which should continue for a total of 3 
months, through the month of March.

9.  Ferrous sulfate 325 mg p.o. daily to continue through 03/01.

10.  Gabapentin 400 mg p.o. three times daily.

11.  Hydrocodone/acetaminophen 5/325 one p.o. q.6 hours p.r.n.

12.  Hydrocortisone 2.5% cream topical for hemorrhoids p.r.n. twice daily.

13.  Insulin NPH 20 units subcutaneous at bedtime.

14.  Levothyroxine 75 mcg p.o. daily.

15.  Lidocaine patches p.r.n. daily.

16.  Lisinopril 10 mg p.o. daily.

17.  Magnesium oxide 400 mg p.o. twice daily with meals.

18.  Metformin 1000 mg p.o. twice daily with meals.

19.  Nitroglycerin ointment 2% 0.5 inches topically to skin q.6 hours p.r.n. 
autonomic dysreflexia.

20.  Nystatin powder p.r.n.

21.  Senna/docusate 1 tab p.o. at bedtime.

22.  Triamcinolone ointment to rash p.r.n.

23.  Zinc oxide moisture barrier as needed.



ISSUES TO BE ADDRESSED AT FOLLOWUP:  

1.  Debility due to spinal cord injury.  She should continue PT and OT and hope 
for gradual continued improvement.

2.  Neurogenic bowel and bladder.  Continue neurogenic bowel protocol as 
described above.

3.  Neurogenic bladder.  Should she develop adequate truncal stability, 
consider training for intermittent self catheterization.

4.  Chronic conditions of dyslipidemia, hypothyroidism, diabetes mellitus type 
2 and hypertension.  Continue medications and follow up with her new attending 
physician at the skilled nursing facility.



Copy requested to:

Dr. Srikanth Collins [facility] 

Dr. Arnaldo Santillan #:  [id number]

MTDD

## 2019-06-25 NOTE — SOAPPROG
SOAP Progress Note


Assessment/Plan: 


Assessment:








Spinal cord injury, T1 level, incomplete RYAN class C.  PT and OT to optimize 

her mobility and activities of daily living.  GABAPENTIN FOR LOWER EXTREMITY 

NEUROPATHIC PAIN.  She reports some increase in lower extremity tenderness 

secondary to fibromyalgia.





Complications of spinal cord injury with neurogenic bowel, neurogenic bladder 

and very impaired lower extremity motor function.  She will be treated with the 

neurogenic bowel and bladder protocols.  


* Liu catheter to be removed 2/1/2019 or as soon as the unit obtains enough 

catheters for Q 4 hr intermittent straight catheterization.  Team rounds on 02/ 04 indicates the patient is unable to perform self catheterization due to a 

combination of body habitus and inability to abduct hips.  She has adequate 

upper extremity motor function and sensation to be able to accomplish this 

task.  


* She is at risk for autonomic dysreflexia and will be monitored for signs of 

distress or elevated blood pressure and will be treated with the autonomic 

dysreflexia protocol on the rehabilitation unit. 





Chronic conditions of hypertension, dyslipidemia, hypothyroidism, and diabetes 

mellitus type 2.  She will be continued on medications for these conditions.


* Initiate metformin 500 mg twice daily with meals beginning evening of 2/1/ 2019.  SLIDING SCALE INSULIN DISCHARGED OVER THE WEEKEND.


History of peripheral neuropathy.  She is treated with nortriptyline 10 mg at 

h.s.  As she also reports insomnia, I will increase this to 25 mg at bedtime.  

Continue gabapentin at 100 mg three times daily.  Consider titration if 

symptoms are bothersome.  She reports her lower extremity pain is well 

controlled.





HYPERTENSION-BLOOD PRESSURES TO ON 02/04 143/79.





Peripheral vascular disease.  Continue aspirin.  GOOD CAPILLARY REFILL OF BOTH 

LOWER EXTREMITIES





Skin lesions on the left toes.  WOUND CARE NOTE FROM 02/05 WRITTEN APPRECIATED.

  NURSES AWARE OF CURRENT SKIN CARE PLAN.  These appear to be more likely 

consistent with cholesterol emboli than with pressure ulcers.  NURSING HAS 

NOTIFIED WOUND CARE NURSE WHO WILL SEE PATIENT TODAY.





Fluid overload seen in the hospital.  ON 02/05 EXAM NO EVIDENCE OF FLUID 

OVERLOAD.


* Continue furosemide.  Renal function normal on BMP 2/1/2019.





Hypomagnesemia.  Continue magnesium oxide.  Magnesium very slightly low on 2/1/ 2019.





Anemia in the hospital.  


* Also had significant iron deficiency, so initiated iron sulfate 325 mg q.day 

starting 2/1/2019.


* Stable on CBC 2/1/2019, with hemoglobin 8.3 and hematocrit 25.8.  Recheck 

hemoglobin and hematocrit in 1 week





Obesity and diabetes mellitus will merit a consult with the dietitian for 

optimal nutrition.  





Prophylaxis.  She has prolonged immobility and elevated risk for DVT.  Will 

initiate enoxaparin at 40 mg subcutaneous daily.  This can be discontinued if 

her mobility improves significantly or once she is 2-3 months out from the 

onset of her spinal cord injury.  OCCUPATIONAL THERAPY HAS ADDED THIGH-HIGH 

COMPRESSION STOCKINGS WHICH IS APPROPRIATE.




















Plan:





02/04/19 11:15





02/04/19 11:17





02/05/19 10:18





Subjective: 





She reports that her legs are tender secondary to fibromyalgia.  She does not 

report suprapubic pain.  She has been getting straight cathed every 4 hr.  She 

does not feel lightheaded when performing supine to sit transfers.  She has not 

noticed any  return of lower extremity motor function


Objective: 





 Vital Signs











Temp Pulse Resp BP Pulse Ox


 


 37.0 C   92   17   138/69 H  97 


 


 02/05/19 05:46  02/05/19 05:46  02/05/19 05:46  02/05/19 08:19  02/05/19 05:46








 Microbiology











 02/04/19 13:00 Gastrointestinal Tract Panel (PCR) - Final





 Stool    No Organism Detected By Pcr








 Laboratory Results





 02/01/19 06:00 





 02/01/19 06:00 





 











 02/04/19 02/05/19 02/06/19





 05:59 05:59 05:59


 


Intake Total 1350 1126 472


 


Output Total 1950 2100 


 


Balance -600 -976 472














Physical Exam





- Physical Exam


General Appearance: WD/WN, alert, no apparent distress


Respiratory: lungs clear, normal breath sounds


Abdomen: non-tender, soft, other (No suprapubic tenderness)


Skin: warm/dry, other (Left foot is warm, no erythema.  No change in eschars on 

toes.  Significant tissue sloughing of all digits on the left foot continue.)


Neuro/Psych: motor weakness (1-2/5 right and left ankle dorsiflexors, otherwise 

no functional movement of lower extremities.)





ICD10 Worksheet


Patient Problems: 


 Problems











Problem Status Onset


 


Spinal cord stroke Acute Dorsal Nasal Flap Text: The defect edges were debeveled with a #15 scalpel blade.  Given the location of the defect and the proximity to free margins a dorsal nasal flap was deemed most appropriate.  Using a sterile surgical marker, an appropriate dorsal nasal flap was drawn around the defect.    The area thus outlined was incised deep to adipose tissue with a #15 scalpel blade.  The skin margins were undermined to an appropriate distance in all directions utilizing iris scissors.

## 2024-07-26 NOTE — GHP
Patient remained most of the shift in room, out for meals and short periods of time. Patient appears depressed and withdrawn although denies SI/HI/AH/VH and depression/ Compliant with medication and routine vitas.    [f 
rep st]



                                                            HISTORY AND PHYSICAL





DATE OF ADMISSION:  01/31/2019



TIME OF EVALUATION:  1855.



REFERRING FACILITY:  [facility].



REFERRING PHYSICIAN:  Dr. Odell



IMPAIRMENT GROUP:  [id number].



CONSULTING PHYSICIANS:  There were discussions with Neurology and Neurosurgery, 
but no formal consultations.



REHABILITATION DIAGNOSIS:  Spinal cord injury due to ischemia, T1 incomplete 
RYAN impairment C.



ETIOLOGIC DIAGNOSIS:  Paraplegia, unspecified (nontraumatic).  



Date of onset was 12/07/2018.  



Date of surgery was 01/07/2019.



HISTORY OF PRESENT ILLNESS:  This patient sustained an ischemic spinal cord 
injury from the thoracic cord to the conus as a consequence of an elective 
aortoiliac bypass graft which included reimplantation of 2 renal arteries.  
This surgery was done on 12/07/2018.  She had bilateral lower extremity 
weakness afterwards and was discharged to a skilled nursing facility for 
rehabilitation.  She was very unsatisfied with the level of care at the skilled 
nursing facility and went home early.  However, she was not able to effectively 
be managed at home where she was living with her disabled  and a son who 
works full time.  She returned to the [facility] where she had 
further evaluation and treatment for abnormal laboratory results.  She had 
hypokalemia and hypomagnesemia.  There was a small sacral ulcer and severe 
constipation.  In the hospital, her electrolytes were replaced.  She was placed 
on an aggressive bowel regimen and had 1 hard and large stool on 01/24/2019.  
She had bright red blood per rectum subsequently, and was found to have 
hemorrhoids.  She was maintained on a Liu catheter which was last exchanged 
on 01/28/2019.  She was mildly volume overloaded and treated with furosemide.



STUDIES AND LABS IN THE HOSPITAL:  Hemoglobin A1c was 6.7. Total iron was 15, 
ferritin was high at 346, folate was high, transferrin was 156.  Vitamin B12 
was normal at 674.  A CBC showed anemia with a hemoglobin of 8.7 and hematocrit 
of 25.9 on 01/22/2019.  Platelet count was normal and white blood cell count 
was normal.  BMP on the same date showed a low potassium at 2.9, otherwise was 
normal.  Magnesium was low at 1.3.  EKG showed sinus rhythm with a left axis 
deviation and moderate intraventricular conduction delay.  Most recent labs 
were done on 01/30/2019.  Basic metabolic profile was normal.  CBC on 01/29/
2019 showed a hemoglobin of 8.6 and hematocrit of 26.3.  She had normal 
platelet count and normal white blood cell count.  Phosphorus on 01/30/2019, 
was normal at 3.6, and magnesium was slightly low at 1.4.



PRECAUTIONS:  She is a fall risk.



ACTIVE COMORBIDITIES:  She has the tier 3 comorbidity of hemiparesis which is 
actually paraplegia.  She has morbid obesity.  She has diabetes mellitus with 
manifestations including peripheral vascular disease.  She otherwise has no 
active tier 1, tier 2 or tier 3 comorbidities.



PAST MEDICAL HISTORY:  

1.  Peripheral vascular disease, status post aortoiliac bypass with 
reimplantation of 2 left renal arteries, the LORRI and the SMA.

2.  Hospital delirium. 

3.  Pseudomonas UTI and sepsis in the hospital. 

4.  Spinal cord ischemia. 

5.  Acute kidney injury with dialysis during her prior hospitalization and 
subsequent recovery of renal function. 

6.  Anemia. 

7.  Neurogenic bladder. 

8.  Spinal degenerative joint disease. 

9.  Chronic pain. 

10.  Claudication due to peripheral vascular disease. 

11.  Diabetes mellitus type 2. 

12.  Eczema. 

13.  Cystocele. 

14.  Fibromyalgia. 

15.  Gastroesophageal reflux disease. 

16.  Basal cell skin carcinoma. 

17.  Hypertension. 

18.  Dyslipidemia. 

19.  Hypothyroidism. 

20.  Irritable bowel syndrome. 

21.  Lyme disease. 

22.  Meningioma. 

23.  Occipital neuralgia. 

24.  Peripheral neuropathy. 

25.  Essential tremor.



PAST SURGICAL HISTORY:  

1.  She has had the aortoiliac bypass graft. 

2.  Cholecystectomy. 

3.  Hysterectomy. 

4.  Salpingo-oophorectomy, bilateral. 

5.  Intraoperative urethrolysis. 

6.  Bladder suspension and surgery for cystocele and rectocele. 

7.  Right knee replacement. 

8.  Tonsillectomy.



MEDICATIONS:  Home medications are no longer relevant as she has had prolonged 
hospitalizations, and I do not have the list of her discharge medications from 
her prior hospitalization.



ADMISSION MEDICATIONS:  

1.  Amlodipine 5 mg p.o. daily. 

2.  Aspirin 81 mg p.o. daily. 

3.  Atorvastatin 20 mg p.o. at bedtime.

4.  B complex 1 p.o. daily. 

5.  Cholecalciferol 1000 units p.o. daily. 

6.  Gabapentin 100 mg p.o. t.i.d. 

7.  Hydrocodone/acetaminophen 5/325 one p.o. q.6 hours p.r.n. 

8.  Levothyroxine 75 mcg p.o. daily. 

9.  Nortriptyline 10 mg p.o. at bedtime. 

10.  Nystatin powder to affected area b.i.d. p.r.n. 

11.  Senna/docusate 2 p.o. b.i.d. 

12.  Tamsulosin 0.4 mg p.o. at bedtime.

13.  Triamcinolone 0.1% to affected areas twice daily.

14.  Insulin NPH 18 units subcutaneous at bedtime. 

15.  Furosemide 20 mg p.o. daily. 

16.  Hydrocortisone, Anusol HC suppository q.12 hours p.r.n. hemorrhoidal 
symptoms.



ALLERGIES:  Listed to doxycycline, duloxetine, gabapentin, morphine, and 
topiramate, but these are all intolerances rather than true allergies.



PSYCHOSOCIAL HISTORY:  She is .  Her  has disability and is 
living with her daughter at home.  She lives with her son and daughter-in-law 
and 2 teenage grandchildren.  She has never been a smoker.  She has worked as a 
 and as an  and a stand-up comic.  There is 1 stair to enter 
the home, but her family has already constructed a ramp.



FAMILY HISTORY:  Noncontributory.



REVIEW OF SYSTEMS:  She has little pain, but she has been using Norco at 
bedtime to help her get to sleep.  She has no cough or dyspnea.  She has 
sensation throughout and notes that she has some movement returning to her feet 
and more so on her right leg.  She feels she has normal strength in her upper 
extremities.  She has no cough or dyspnea.  She has no fevers or chills.  She 
has no nausea or vomiting.  Her last bowel movement was yesterday.  She is 
aware when she has the acute need to defecate but finds she has no control and 
has fecal incontinence.  She reports that she has done self catheterization of 
her bladder in the past following her bladder procedure and is willing to do so 
once again.  Otherwise, a 10-point review of systems is negative.



PHYSICAL EXAMINATION:  VITAL SIGNS:  Vitals are not yet available in the chart.
  Her blood pressure was in the 150s over the 90s.  Her pulse was in the 90s.  
She appears to be significantly obese, but weight is not yet recorded.  GENERAL
:  This is a well-nourished, well-developed, obese woman, dressed in hospital 
gown, in bed, but neatly groomed, cooperative and in no acute distress.  HEENT:
  Extraocular movements are intact.  Pupils are equal, round, reactive to 
light.  Mucous members are moist.  Dentition is in good condition.  NECK:  
Supple.  HEART:  Regular rate and rhythm with no murmurs, rubs, or gallops.  
CHEST:  Lungs are clear to auscultation bilaterally.  ABDOMEN:  Soft, nontender
, nondistended with normoactive bowel sounds and no hepatosplenomegaly.  
EXTREMITIES:  There is no cyanosis.  There is 1+ edema bilaterally, pretibial.  
Radial and dorsalis pedis pulses are 2+ bilaterally.  NEUROLOGIC:  She is alert 
and oriented x3.  Cranial nerves 2-12 are grossly intact.  Upper extremity 
motor strength is normal.  Sensation is present in all extremities.  On the 
lower extremity on the right leg she is able to plantar flex and dorsiflex the 
foot and great toe with approximately 3/5 strength.  She has 2/5 strength at 
the right quadriceps.  Otherwise, there was no motor function noted in the 
right lower extremity.  In the left lower extremity, she has minimal movement 
at the toes and otherwise there is no motor strength noted.  Deep tendon 
reflexes are absent bilaterally at the patella and Achilles tendons.  SKIN:  
There is no ulceration seen over the coccyx, sacrum, or buttocks.  She has 
eschar and denuded skin over time most of her toes on the left lower extremity.



CURRENT LEVEL OF FUNCTION:  Per the pre-admission screen.  She was able to feed 
herself with setup and supervision.  Grooming was done with minimal assist and 
cues while sitting.  Dressing upper body required moderate assistance and lower 
body was completely dependent.  Toileting was completely dependent. Regarding 
bladder function, she had a Liu catheter.  She was incontinent of bowel.  Bed 
mobility, from supine to sit required maximal assistance.  Transfers were done 
with a Burke lift.  Balance, seated required maximal assist, and there were 
decreased balance reactions.  Endurance was poor.  Regarding cognition, she was 
noted to have decreased safety. 



On today's exam, there are no significant changes from the preadmission screen.



IMPRESSION:  This is a 78-year-old woman who had an aorto-iliac bypass graft 
but was found to have a friable aorta and had a prolonged surgery with emergent 
measures needed to reconstruct aorta, origins of the renal arteries and 
inferior and superior mesenteric arteries.  She, unfortunately, suffered a 
spinal cord ischemia during and/or after the procedure and was found to have 
minimal movement of the lower extremities, and to be an incomplete paraplegic 
RYAN class C at the T1 level.  She has had a complicated hospital course 
following that surgery, including hospital delirium, acute kidney injury and 
pseudomonas UTI and sepsis.  She was eventually stabilized and transferred to a 
skilled nursing facility, but she left early as she felt she was not getting 
appropriate care there.  She was unable to be cared for at home by her family 
and returned to the hospital with severe constipation and electrolyte 
abnormalities.  Electrolytes have been replaced.  Constipation has resolved, 
and she was otherwise medically stabilized and appropriate for inpatient 
rehabilitation. 



Her goal is to have decreased burden of care.  She reports that she would like 
to return home.  It is my understanding that the alternative disposition might 
be to a skilled nursing facility for long-term care if her family is unable to 
provide sufficient level of care at home.  For a safe discharge, it is 
anticipated that she will be able to progress to increased assist with 
repositioning in bed.  She will be at standby assist level with seated balance.
  She will be independent with pressure relief in the wheelchair, independent 
with upper body dressing, independent with grooming and hygiene and independent 
in the self direction of assist required for completion of transfers, ADLs, 
bowel and bladder management, and skin care.  There will be family education 
regarding current medical and functional status. 



She will have therapy with physical therapy and occupational therapy for 90 
minutes per day on 5-7 days of the week.  Her expected duration of stay is 3-4 
weeks. 



It is anticipated that upon discharge, she will continue to require home health 
services including nursing, a nurse's aide, social work, occupational therapy, 
and physical therapy.  Additionally, she will benefit from a spinal cord injury 
support group.



PLAN:  

1.  Spinal cord injury, T1 level, incomplete RYAN class C.  PT and OT to 
optimize her mobility and activities of daily living. 

2.  Complications of spinal cord injury with neurogenic bowel, neurogenic 
bladder and very impaired lower extremity motor function.  She will be treated 
with the neurogenic bowel and bladder protocols.  Liu catheter will be 
removed tomorrow, and she will be taught once again in intermittent bladder 
catheterization to be done by herself.  She has adequate upper extremity motor 
function and sensation to be able to accomplish this task.  She is at risk for 
autonomic dysreflexia and will be monitored for signs of distress or elevated 
blood pressure and will be treated with the autonomic dysreflexia protocol on 
the rehabilitation unit. 

3.  Chronic conditions of hypertension, dyslipidemia, hypothyroidism, and 
diabetes mellitus type 2.  She will be continued on medications for these 
conditions. 

4.  History of peripheral neuropathy.  She is treated with nortriptyline at 10 
mg at h.s. as she also reports insomnia.  I will increase this to 25 mg at 
bedtime. 

5.  Peripheral vascular disease.  Continue aspirin. 

6.  Skin lesions on the left toes.  These appear to be more likely consistent 
with cholesterol emboli than with pressure ulcers.  There will be a wound nurse 
consult to optimize her management. 

7.  Fluid overload seen in the hospital.  Continue furosemide and will check a 
basic metabolic profile in the morning. 

8.  Anemia in the hospital.  Will check a CBC in the morning.  We will 
institute iron sulfate at 325 mg daily for 30 days for iron deficiency, though 
March 2, 2019.  

9.  Obesity and diabetes mellitus will merit a consult with the dietitian for 
optimal nutrition.  

10.  Prophylaxis.  She has prolonged immobility and elevated risk for DVT.  
Will initiate enoxaparin at 40 mg subcutaneous daily.  This can be discontinued 
if her mobility improves significantly or once she is 2-3 months out from the 
onset of her spinal cord injury.





Job #:  [id number]


MTDD